# Patient Record
Sex: FEMALE | Race: WHITE | NOT HISPANIC OR LATINO | Employment: STUDENT | URBAN - METROPOLITAN AREA
[De-identification: names, ages, dates, MRNs, and addresses within clinical notes are randomized per-mention and may not be internally consistent; named-entity substitution may affect disease eponyms.]

---

## 2023-09-01 PROBLEM — S63.619A SPRAINED FINGER AND THUMB: Status: ACTIVE | Noted: 2023-09-01

## 2023-09-01 PROBLEM — S63.609A SPRAINED FINGER AND THUMB: Status: ACTIVE | Noted: 2023-09-01

## 2023-09-01 PROBLEM — S69.90XA INJURY OF FINGER: Status: ACTIVE | Noted: 2023-09-01

## 2023-09-01 PROBLEM — M25.579 ANKLE PAIN: Status: ACTIVE | Noted: 2023-09-01

## 2023-09-01 PROBLEM — H69.91 DYSFUNCTION OF RIGHT EUSTACHIAN TUBE: Status: ACTIVE | Noted: 2023-09-01

## 2023-09-01 PROBLEM — E55.9 VITAMIN D DEFICIENCY: Status: ACTIVE | Noted: 2023-09-01

## 2023-09-01 PROBLEM — M47.819 SPONDYLOARTHRITIS: Status: ACTIVE | Noted: 2023-09-01

## 2023-09-01 PROBLEM — J45.990 BRONCHOSPASM, EXERCISE-INDUCED (HHS-HCC): Status: ACTIVE | Noted: 2023-09-01

## 2023-09-01 PROBLEM — M06.09 POLYARTHRITIS WITH NEGATIVE RHEUMATOID FACTOR (MULTI): Status: ACTIVE | Noted: 2023-09-01

## 2023-09-01 PROBLEM — F41.9 ANXIETY AND DEPRESSION: Status: ACTIVE | Noted: 2023-09-01

## 2023-09-01 PROBLEM — M32.19 OTHER ORGAN OR SYSTEM INVOLVEMENT IN SYSTEMIC LUPUS ERYTHEMATOSUS (MULTI): Status: ACTIVE | Noted: 2023-09-01

## 2023-09-01 PROBLEM — G90.50 COMPLEX REGIONAL PAIN SYNDROME I: Status: ACTIVE | Noted: 2023-09-01

## 2023-09-01 PROBLEM — F32.A ANXIETY AND DEPRESSION: Status: ACTIVE | Noted: 2023-09-01

## 2023-09-01 PROBLEM — S89.90XA INJURY OF KNEE: Status: ACTIVE | Noted: 2023-09-01

## 2023-09-01 RX ORDER — TRAZODONE HYDROCHLORIDE 150 MG/1
TABLET ORAL
COMMUNITY

## 2023-09-01 RX ORDER — BECLOMETHASONE DIPROPIONATE HFA 80 UG/1
80 AEROSOL, METERED RESPIRATORY (INHALATION)
COMMUNITY
Start: 2019-12-16

## 2023-09-01 RX ORDER — BUPROPION HYDROCHLORIDE 300 MG/1
TABLET ORAL
COMMUNITY

## 2023-09-01 RX ORDER — GLUC/MSM/COLGN2/HYAL/ANTIARTH3 375-375-20
TABLET ORAL
COMMUNITY

## 2023-09-01 RX ORDER — NORETHINDRONE AND ETHINYL ESTRADIOL AND FERROUS FUMARATE 0.4-35(21)
1 KIT ORAL DAILY
COMMUNITY
End: 2024-02-23 | Stop reason: SDUPTHER

## 2023-09-01 RX ORDER — ERGOCALCIFEROL 1.25 MG/1
50000 CAPSULE ORAL
COMMUNITY
Start: 2023-03-03

## 2023-09-01 RX ORDER — GABAPENTIN 300 MG/1
CAPSULE ORAL
COMMUNITY
Start: 2022-06-07

## 2023-09-01 RX ORDER — GABAPENTIN 400 MG/1
400 CAPSULE ORAL 3 TIMES DAILY
COMMUNITY
Start: 2022-06-07 | End: 2024-02-23 | Stop reason: SDUPTHER

## 2023-09-01 RX ORDER — ASCORBIC ACID 500 MG
TABLET ORAL
COMMUNITY

## 2023-09-01 RX ORDER — LIDOCAINE HYDROCHLORIDE 20 MG/ML
SOLUTION ORAL; TOPICAL
COMMUNITY
Start: 2022-11-05 | End: 2023-12-15 | Stop reason: ALTCHOICE

## 2023-09-01 RX ORDER — ESCITALOPRAM OXALATE 20 MG/1
TABLET ORAL
COMMUNITY
Start: 2019-08-28

## 2023-09-01 RX ORDER — HYDROXYCHLOROQUINE SULFATE 200 MG/1
TABLET, FILM COATED ORAL
COMMUNITY
Start: 2022-06-07 | End: 2023-10-07

## 2023-09-01 RX ORDER — BELIMUMAB 200 MG/ML
200 SOLUTION SUBCUTANEOUS
COMMUNITY
Start: 2023-03-06 | End: 2023-10-25 | Stop reason: SDUPTHER

## 2023-09-01 RX ORDER — ALBUTEROL SULFATE 90 UG/1
2 AEROSOL, METERED RESPIRATORY (INHALATION) AS NEEDED
COMMUNITY
Start: 2020-07-31

## 2023-09-01 RX ORDER — UBIDECARENONE 75 MG
CAPSULE ORAL
COMMUNITY

## 2023-10-07 DIAGNOSIS — R76.8 OTHER SPECIFIED ABNORMAL IMMUNOLOGICAL FINDINGS IN SERUM: ICD-10-CM

## 2023-10-07 RX ORDER — HYDROXYCHLOROQUINE SULFATE 200 MG/1
TABLET, FILM COATED ORAL
Qty: 30 TABLET | Refills: 11 | Status: SHIPPED | OUTPATIENT
Start: 2023-10-07

## 2023-10-25 DIAGNOSIS — M47.819 SPONDYLOARTHRITIS: ICD-10-CM

## 2023-10-25 DIAGNOSIS — M06.09 POLYARTHRITIS WITH NEGATIVE RHEUMATOID FACTOR (MULTI): ICD-10-CM

## 2023-10-25 RX ORDER — BELIMUMAB 200 MG/ML
200 SOLUTION SUBCUTANEOUS
Qty: 12 ML | Refills: 11 | Status: SHIPPED | OUTPATIENT
Start: 2023-10-25 | End: 2024-03-19

## 2023-10-25 NOTE — TELEPHONE ENCOUNTER
PT CALLED & STATES NEEDS NEW Rx FOR BENLYSTA BECAUSE INS WON'T COVER MEDS NOW. INFORMED PT WOULD HAVE DR ACUÑA SEND NEW Rx & THIS WOULD THEN GENERATE A NEW PA. THAT IS PROBABLY WHAT SHE NEEDS.

## 2023-10-30 ENCOUNTER — SPECIALTY PHARMACY (OUTPATIENT)
Dept: PHARMACY | Facility: CLINIC | Age: 18
End: 2023-10-30

## 2023-11-29 ENCOUNTER — APPOINTMENT (OUTPATIENT)
Dept: RHEUMATOLOGY | Facility: CLINIC | Age: 18
End: 2023-11-29

## 2023-12-15 ENCOUNTER — OFFICE VISIT (OUTPATIENT)
Dept: PRIMARY CARE | Facility: CLINIC | Age: 18
End: 2023-12-15
Payer: COMMERCIAL

## 2023-12-15 VITALS
HEART RATE: 105 BPM | HEIGHT: 62 IN | TEMPERATURE: 98.1 F | BODY MASS INDEX: 20.98 KG/M2 | WEIGHT: 114 LBS | DIASTOLIC BLOOD PRESSURE: 72 MMHG | SYSTOLIC BLOOD PRESSURE: 108 MMHG | OXYGEN SATURATION: 97 %

## 2023-12-15 DIAGNOSIS — R35.0 URINARY FREQUENCY: ICD-10-CM

## 2023-12-15 DIAGNOSIS — N30.90 CYSTITIS: Primary | ICD-10-CM

## 2023-12-15 LAB
POC APPEARANCE, URINE: CLEAR
POC BILIRUBIN, URINE: NEGATIVE
POC BLOOD, URINE: NEGATIVE
POC COLOR, URINE: YELLOW
POC GLUCOSE, URINE: NEGATIVE MG/DL
POC KETONES, URINE: NEGATIVE MG/DL
POC LEUKOCYTES, URINE: ABNORMAL
POC NITRITE,URINE: POSITIVE
POC PH, URINE: 7 PH
POC PROTEIN, URINE: NEGATIVE MG/DL
POC SPECIFIC GRAVITY, URINE: 1.01
POC UROBILINOGEN, URINE: 0.2 EU/DL

## 2023-12-15 PROCEDURE — 81003 URINALYSIS AUTO W/O SCOPE: CPT

## 2023-12-15 PROCEDURE — 99213 OFFICE O/P EST LOW 20 MIN: CPT

## 2023-12-15 PROCEDURE — 87086 URINE CULTURE/COLONY COUNT: CPT

## 2023-12-15 PROCEDURE — 1036F TOBACCO NON-USER: CPT

## 2023-12-15 RX ORDER — NITROFURANTOIN 25; 75 MG/1; MG/1
100 CAPSULE ORAL 2 TIMES DAILY
Qty: 14 CAPSULE | Refills: 0 | Status: SHIPPED | OUTPATIENT
Start: 2023-12-15 | End: 2023-12-22

## 2023-12-15 ASSESSMENT — PATIENT HEALTH QUESTIONNAIRE - PHQ9
2. FEELING DOWN, DEPRESSED OR HOPELESS: NOT AT ALL
1. LITTLE INTEREST OR PLEASURE IN DOING THINGS: NOT AT ALL
SUM OF ALL RESPONSES TO PHQ9 QUESTIONS 1 AND 2: 0

## 2023-12-15 ASSESSMENT — ENCOUNTER SYMPTOMS
HEMATURIA: 0
SWEATS: 0
NAUSEA: 0
CHILLS: 0
FREQUENCY: 1
FLANK PAIN: 0
VOMITING: 0

## 2023-12-15 ASSESSMENT — PAIN SCALES - GENERAL: PAINLEVEL: 4

## 2023-12-15 NOTE — PROGRESS NOTES
"Subjective   Patient ID: Rayna Willams is a 18 y.o. female who presents for Urinary Frequency (X 3 days) and Urinary Problem.    UTI   This is a new problem. Episode onset: 3 days ago. The problem occurs every urination. The problem has been unchanged. The quality of the pain is described as burning. The pain is at a severity of 4/10. The pain is moderate. There has been no fever. She is Sexually active. There is No history of pyelonephritis. Associated symptoms include frequency, hesitancy and urgency. Pertinent negatives include no chills, discharge, flank pain, hematuria, nausea, possible pregnancy, sweats or vomiting. She has tried nothing for the symptoms. The treatment provided no relief. Her past medical history is significant for recurrent UTIs. There is no history of catheterization, kidney stones, a single kidney, urinary stasis or a urological procedure.            Review of Systems   Constitutional:  Negative for chills, fatigue and fever.   Gastrointestinal:  Negative for abdominal pain, nausea and vomiting.   Genitourinary:  Positive for dysuria, frequency, hesitancy and urgency. Negative for difficulty urinating, flank pain and hematuria.   Musculoskeletal:  Negative for back pain.       Objective   Blood Pressure 108/72 (BP Location: Left arm)   Pulse 105   Temperature 36.7 °C (98.1 °F) (Temporal)   Height 1.575 m (5' 2\")   Weight 51.7 kg (114 lb)   Oxygen Saturation 97%   Body Mass Index 20.85 kg/m²     Physical Exam  Vitals and nursing note reviewed.   Constitutional:       General: She is not in acute distress.     Appearance: Normal appearance. She is normal weight.   Cardiovascular:      Rate and Rhythm: Normal rate and regular rhythm.      Heart sounds: Normal heart sounds, S1 normal and S2 normal.   Pulmonary:      Effort: Pulmonary effort is normal.      Breath sounds: Normal breath sounds.   Abdominal:      General: Abdomen is flat. Bowel sounds are normal. There is no distension.      " Palpations: Abdomen is soft. There is no hepatomegaly or splenomegaly.      Tenderness: There is abdominal tenderness in the suprapubic area. There is no right CVA tenderness, left CVA tenderness, guarding or rebound.   Skin:     General: Skin is warm and dry.      Capillary Refill: Capillary refill takes less than 2 seconds.   Neurological:      General: No focal deficit present.      Mental Status: She is alert.         Assessment/Plan   Problem List Items Addressed This Visit    None  Visit Diagnoses       Diagnosis Codes    Cystitis    -  Primary  Acute.  Urine dipstick: +leukocytes, +nitrates  Urine sent for C & S, will call with results and adjust treatment accordingly.  Start ATB - please complete full course unless you hear otherwise from our office.  Discussed indication for antibiotic use, administration instructions, and adverse effects with patient.  Increase your fluid intake, Tylenol or Motrin as needed for pain or fever.  May use OTC AZO/UROSTAT/Cystex for symptoms relief if desired.   Discussed hygiene for prevention.  Call our office to report and new fever/chills or back pain.  Follow up in 1 week if symptoms persist.    N30.90    Relevant Medications    nitrofurantoin, macrocrystal-monohydrate, (Macrobid) 100 mg capsule    Other Relevant Orders    Urine Culture (Completed)    Urinary frequency     R35.0    Relevant Orders    POCT UA Automated manually resulted (Completed)

## 2023-12-16 LAB — BACTERIA UR CULT: NORMAL

## 2023-12-17 ASSESSMENT — ENCOUNTER SYMPTOMS
DIFFICULTY URINATING: 0
DYSURIA: 1
BACK PAIN: 0
FATIGUE: 0
ABDOMINAL PAIN: 0
FEVER: 0

## 2024-01-22 ENCOUNTER — LAB (OUTPATIENT)
Dept: LAB | Facility: LAB | Age: 19
End: 2024-01-22
Payer: COMMERCIAL

## 2024-01-22 DIAGNOSIS — M06.09 RHEUMATOID ARTHRITIS WITHOUT RHEUMATOID FACTOR, MULTIPLE SITES (MULTI): ICD-10-CM

## 2024-01-22 DIAGNOSIS — D64.9 ANEMIA, UNSPECIFIED: ICD-10-CM

## 2024-01-22 DIAGNOSIS — E55.9 VITAMIN D DEFICIENCY, UNSPECIFIED: ICD-10-CM

## 2024-01-22 DIAGNOSIS — R76.8 OTHER SPECIFIED ABNORMAL IMMUNOLOGICAL FINDINGS IN SERUM: Primary | ICD-10-CM

## 2024-01-22 DIAGNOSIS — R59.1 GENERALIZED ENLARGED LYMPH NODES: ICD-10-CM

## 2024-01-22 DIAGNOSIS — M32.19 OTHER ORGAN OR SYSTEM INVOLVEMENT IN SYSTEMIC LUPUS ERYTHEMATOSUS (MULTI): ICD-10-CM

## 2024-01-22 DIAGNOSIS — M47.819 SPONDYLOSIS WITHOUT MYELOPATHY OR RADICULOPATHY, SITE UNSPECIFIED: ICD-10-CM

## 2024-01-22 DIAGNOSIS — Z79.899 OTHER LONG TERM (CURRENT) DRUG THERAPY: ICD-10-CM

## 2024-01-22 DIAGNOSIS — M79.2 NEURALGIA AND NEURITIS, UNSPECIFIED: ICD-10-CM

## 2024-01-22 LAB
25(OH)D3 SERPL-MCNC: 20 NG/ML (ref 31–100)
ALBUMIN SERPL-MCNC: 4.5 G/DL (ref 3.5–5)
ALP BLD-CCNC: 47 U/L (ref 35–125)
ALT SERPL-CCNC: 8 U/L (ref 5–40)
ANION GAP SERPL CALC-SCNC: 12 MMOL/L
APPEARANCE UR: ABNORMAL
AST SERPL-CCNC: 13 U/L (ref 5–40)
BACTERIA #/AREA URNS AUTO: ABNORMAL /HPF
BASOPHILS # BLD AUTO: 0.04 X10*3/UL (ref 0–0.1)
BASOPHILS NFR BLD AUTO: 0.6 %
BILIRUB SERPL-MCNC: <0.2 MG/DL (ref 0.1–1.2)
BILIRUB UR STRIP.AUTO-MCNC: NEGATIVE MG/DL
BUN SERPL-MCNC: 11 MG/DL (ref 8–25)
C3 SERPL-MCNC: 114 MG/DL (ref 87–200)
C4 SERPL-MCNC: 16 MG/DL (ref 10–50)
CALCIUM SERPL-MCNC: 9.4 MG/DL (ref 8.5–10.4)
CHLORIDE SERPL-SCNC: 101 MMOL/L (ref 97–107)
CK SERPL-CCNC: 27 U/L (ref 24–195)
CO2 SERPL-SCNC: 25 MMOL/L (ref 24–31)
COLOR UR: ABNORMAL
CREAT SERPL-MCNC: 0.7 MG/DL (ref 0.4–1.6)
CRP SERPL-MCNC: <0.3 MG/DL (ref 0–2)
DSDNA AB SER-ACNC: 1 IU/ML
EGFRCR SERPLBLD CKD-EPI 2021: >90 ML/MIN/1.73M*2
EOSINOPHIL # BLD AUTO: 0.18 X10*3/UL (ref 0–0.7)
EOSINOPHIL NFR BLD AUTO: 2.7 %
ERYTHROCYTE [DISTWIDTH] IN BLOOD BY AUTOMATED COUNT: 11.9 % (ref 11.5–14.5)
ERYTHROCYTE [SEDIMENTATION RATE] IN BLOOD BY WESTERGREN METHOD: 15 MM/H (ref 0–20)
GLUCOSE SERPL-MCNC: 95 MG/DL (ref 65–99)
GLUCOSE UR STRIP.AUTO-MCNC: NORMAL MG/DL
HCT VFR BLD AUTO: 38.7 % (ref 36–46)
HGB BLD-MCNC: 12.4 G/DL (ref 12–16)
IGA SERPL-MCNC: 183 MG/DL (ref 70–400)
IGE SERPL-ACNC: 70 IU/ML (ref 0–214)
IGG SERPL-MCNC: 1150 MG/DL (ref 700–1600)
IGM SERPL-MCNC: 69 MG/DL (ref 40–230)
IMM GRANULOCYTES # BLD AUTO: 0.01 X10*3/UL (ref 0–0.7)
IMM GRANULOCYTES NFR BLD AUTO: 0.2 % (ref 0–0.9)
KETONES UR STRIP.AUTO-MCNC: NEGATIVE MG/DL
LEUKOCYTE ESTERASE UR QL STRIP.AUTO: ABNORMAL
LYMPHOCYTES # BLD AUTO: 2.39 X10*3/UL (ref 1.2–4.8)
LYMPHOCYTES NFR BLD AUTO: 35.9 %
MCH RBC QN AUTO: 28.6 PG (ref 26–34)
MCHC RBC AUTO-ENTMCNC: 32 G/DL (ref 32–36)
MCV RBC AUTO: 89 FL (ref 80–100)
MONOCYTES # BLD AUTO: 0.57 X10*3/UL (ref 0.1–1)
MONOCYTES NFR BLD AUTO: 8.6 %
MUCOUS THREADS #/AREA URNS AUTO: ABNORMAL /LPF
NEUTROPHILS # BLD AUTO: 3.46 X10*3/UL (ref 1.2–7.7)
NEUTROPHILS NFR BLD AUTO: 52 %
NITRITE UR QL STRIP.AUTO: NEGATIVE
NRBC BLD-RTO: 0 /100 WBCS (ref 0–0)
PH UR STRIP.AUTO: 6 [PH]
PLATELET # BLD AUTO: 343 X10*3/UL (ref 150–450)
POTASSIUM SERPL-SCNC: 3.8 MMOL/L (ref 3.4–5.1)
PROT SERPL-MCNC: 7 G/DL (ref 5.9–7.9)
PROT UR STRIP.AUTO-MCNC: ABNORMAL MG/DL
RBC # BLD AUTO: 4.34 X10*6/UL (ref 4–5.2)
RBC # UR STRIP.AUTO: NEGATIVE /UL
RBC #/AREA URNS AUTO: ABNORMAL /HPF
SODIUM SERPL-SCNC: 138 MMOL/L (ref 133–145)
SP GR UR STRIP.AUTO: 1.01
SQUAMOUS #/AREA URNS AUTO: ABNORMAL /HPF
UROBILINOGEN UR STRIP.AUTO-MCNC: NORMAL MG/DL
WBC # BLD AUTO: 6.7 X10*3/UL (ref 4.4–11.3)
WBC #/AREA URNS AUTO: ABNORMAL /HPF
WBC CLUMPS #/AREA URNS AUTO: ABNORMAL /HPF
YEAST BUDDING #/AREA UR COMP ASSIST: PRESENT /HPF

## 2024-01-22 PROCEDURE — 82306 VITAMIN D 25 HYDROXY: CPT

## 2024-01-22 PROCEDURE — 81001 URINALYSIS AUTO W/SCOPE: CPT

## 2024-01-22 PROCEDURE — 85652 RBC SED RATE AUTOMATED: CPT

## 2024-01-22 PROCEDURE — 86332 IMMUNE COMPLEX ASSAY: CPT

## 2024-01-22 PROCEDURE — 82785 ASSAY OF IGE: CPT

## 2024-01-22 PROCEDURE — 85025 COMPLETE CBC W/AUTO DIFF WBC: CPT

## 2024-01-22 PROCEDURE — 82784 ASSAY IGA/IGD/IGG/IGM EACH: CPT

## 2024-01-22 PROCEDURE — 86225 DNA ANTIBODY NATIVE: CPT

## 2024-01-22 PROCEDURE — 86160 COMPLEMENT ANTIGEN: CPT

## 2024-01-22 PROCEDURE — 80053 COMPREHEN METABOLIC PANEL: CPT

## 2024-01-22 PROCEDURE — 36415 COLL VENOUS BLD VENIPUNCTURE: CPT

## 2024-01-22 PROCEDURE — 86140 C-REACTIVE PROTEIN: CPT

## 2024-01-22 PROCEDURE — 82550 ASSAY OF CK (CPK): CPT

## 2024-01-29 LAB
SCAN RESULT: NORMAL
SCAN RESULT: NORMAL

## 2024-02-06 LAB
C1Q SERPL-MCNC: 12.7 MG/DL (ref 10.3–20.5)
IC SERPL C1Q BIND-ACNC: <1.2 UG EQ/ML
IC SERPL RAJI CELL-ACNC: 11.1 UG EQ/ML

## 2024-02-19 PROBLEM — M76.821 POSTERIOR TIBIAL TENDINITIS OF RIGHT LOWER EXTREMITY: Status: ACTIVE | Noted: 2024-02-19

## 2024-02-19 PROBLEM — R35.0 URINARY FREQUENCY: Status: ACTIVE | Noted: 2023-08-04

## 2024-02-19 PROBLEM — D64.9 ANEMIA: Status: ACTIVE | Noted: 2023-07-17

## 2024-02-19 PROBLEM — F45.42 PAIN DISORDER ASSOCIATED WITH PSYCHOLOGICAL FACTORS AND MEDICAL CONDITION: Status: ACTIVE | Noted: 2019-07-24

## 2024-02-19 PROBLEM — L27.0 DERMATITIS DUE TO DRUG: Status: ACTIVE | Noted: 2024-02-19

## 2024-02-19 PROBLEM — M54.2 NECK PAIN: Status: ACTIVE | Noted: 2024-02-19

## 2024-02-19 PROBLEM — M79.2 NEURALGIA AND NEURITIS, UNSPECIFIED: Status: ACTIVE | Noted: 2023-07-17

## 2024-02-19 PROBLEM — R53.83 FATIGUE: Status: ACTIVE | Noted: 2024-02-19

## 2024-02-19 PROBLEM — Z79.3 LONG TERM (CURRENT) USE OF HORMONAL CONTRACEPTIVES: Status: ACTIVE | Noted: 2024-02-19

## 2024-02-19 RX ORDER — NORETHINDRONE ACETATE AND ETHINYL ESTRADIOL .03; 1.5 MG/1; MG/1
TABLET ORAL EVERY 24 HOURS
COMMUNITY
Start: 2023-07-26

## 2024-02-19 RX ORDER — GABAPENTIN 100 MG/1
CAPSULE ORAL
COMMUNITY
Start: 2024-01-16

## 2024-02-23 ENCOUNTER — OFFICE VISIT (OUTPATIENT)
Dept: RHEUMATOLOGY | Facility: CLINIC | Age: 19
End: 2024-02-23
Payer: COMMERCIAL

## 2024-02-23 VITALS
OXYGEN SATURATION: 98 % | WEIGHT: 110.89 LBS | HEART RATE: 89 BPM | HEIGHT: 61 IN | BODY MASS INDEX: 20.94 KG/M2 | SYSTOLIC BLOOD PRESSURE: 104 MMHG | DIASTOLIC BLOOD PRESSURE: 64 MMHG

## 2024-02-23 DIAGNOSIS — M32.19 OTHER ORGAN OR SYSTEM INVOLVEMENT IN SYSTEMIC LUPUS ERYTHEMATOSUS (MULTI): Primary | ICD-10-CM

## 2024-02-23 DIAGNOSIS — M06.09 POLYARTHRITIS WITH NEGATIVE RHEUMATOID FACTOR (MULTI): ICD-10-CM

## 2024-02-23 DIAGNOSIS — Z79.899 ENCOUNTER FOR LONG-TERM (CURRENT) USE OF MEDICATIONS: ICD-10-CM

## 2024-02-23 DIAGNOSIS — E55.9 VITAMIN D DEFICIENCY: ICD-10-CM

## 2024-02-23 PROCEDURE — 99214 OFFICE O/P EST MOD 30 MIN: CPT | Performed by: INTERNAL MEDICINE

## 2024-02-23 PROCEDURE — 1036F TOBACCO NON-USER: CPT | Performed by: INTERNAL MEDICINE

## 2024-02-23 ASSESSMENT — ROUTINE ASSESSMENT OF PATIENT INDEX DATA (RAPID3)
PICK_CLOTHES_OFF_FLOOR: WITHOUT ANY DIFFICULTY
LIFT_CUP_TO_MOUTH: WITHOUT ANY DIFFICULTY
ON A SCALE OF ONE TO TEN, CONSIDERING ALL THE WAYS IN WHICH ILLNESS AND HEALTH CONDITIONS MAY AFFECT YOU AT THIS TIME, PLEASE INDICATE BELOW HOW YOU ARE DOING:: 5
TURN_FAUCETS_OFF: WITHOUT ANY DIFFICULTY
ON A SCALE OF ONE TO TEN, HOW MUCH PAIN HAVE YOU HAD BECAUSE OF YOUR CONDITION OVER THE PAST WEEK?: 5
FEELINGS_DEPRESSION: WITHOUT ANY DIFFICULTY
DRESS_YOURSELF: WITH SOME DIFFICULTY
WALK_KILOMETERS: WITH SOME DIFFICULTY
WEIGHTED_TOTAL_SCORE: 4
SEVERITY_SCORE: 0
ON A SCALE OF ONE TO TEN, CONSIDERING ALL THE WAYS IN WHICH ILLNESS AND HEALTH CONDITIONS MAY AFFECT YOU AT THIS TIME, PLEASE INDICATE BELOW HOW YOU ARE DOING:: 5
FN_SCORE: 2
ON A SCALE OF ONE TO TEN, HOW MUCH PAIN HAVE YOU HAD BECAUSE OF YOUR CONDITION OVER THE PAST WEEK?: 5
GOOD_NIGHTS_SLEEP: WITH SOME DIFFICULTY
IN_OUT_BED: WITHOUT ANY DIFFICULTY
TOTAL RAPID3 SCORE: 12
WALK_FLAT_GROUND: WITH SOME DIFFICULTY
IN_OUT_TRANSPORT: WITH SOME DIFFICULTY
SUM OF QUESTIONS A TO J: 6
WASH_DRY_BODY: WITH SOME DIFFICULTY
FEELINGS_ANXIETY_NERVOUS: WITH SOME DIFFICULTY
PARTIPATE_RECREATIONAL_ACTIVITIES: WITH SOME DIFFICULTY

## 2024-02-23 ASSESSMENT — PATIENT HEALTH QUESTIONNAIRE - PHQ9
1. LITTLE INTEREST OR PLEASURE IN DOING THINGS: NOT AT ALL
SUM OF ALL RESPONSES TO PHQ9 QUESTIONS 1 AND 2: 0
2. FEELING DOWN, DEPRESSED OR HOPELESS: NOT AT ALL

## 2024-02-23 ASSESSMENT — ENCOUNTER SYMPTOMS
OCCASIONAL FEELINGS OF UNSTEADINESS: 1
DEPRESSION: 0
LOSS OF SENSATION IN FEET: 0

## 2024-02-23 ASSESSMENT — PAIN SCALES - GENERAL: PAINLEVEL_OUTOF10: 3

## 2024-02-23 ASSESSMENT — PAIN - FUNCTIONAL ASSESSMENT: PAIN_FUNCTIONAL_ASSESSMENT: 0-10

## 2024-02-23 NOTE — PROGRESS NOTES
[facility]/  Rheumatology  [address] [place] [address], [address]  [place] [id number]  Phone: [phone]  Fax: [phone]    Rheumatology Progress Note 2/23/24    Rayna Willams is a 18 y.o. female here for   Chief Complaint   Patient presents with    Follow-up     labs       Last Visit: 7/7/23    Rheum Hx      Previous Tx    Health Maintenance  DXA- none  Malignancy Hx- none  Immunization History   Administered Date(s) Administered    DTP 01/13/2004, 03/05/2004, 05/26/2004, 02/16/2005    DTaP vaccine, pediatric (DAPTACEL) 03/03/2009    DTaP, Unspecified 01/13/2004, 03/25/2004, 05/27/2004, 02/17/2005    Flu vaccine (IIV4), preservative free *Check age/dose* 10/18/2016    HPV 9-valent vaccine (GARDASIL 9) 12/18/2015, 02/19/2016, 06/20/2016    HPV, Unspecified 12/18/2015, 02/19/2016, 06/20/2016    Hepatitis A vaccine, pediatric/adolescent (HAVRIX, VAQTA) 03/03/2009, 07/16/2010    Hepatitis B vaccine, adult (RECOMBIVAX, ENGERIX) 2003    Hepatitis B vaccine, pediatric/adolescent (RECOMBIVAX, ENGERIX) 2003, 2003, 08/17/2004    HiB, unspecified 01/13/2004, 03/25/2004, 05/27/2004, 02/17/2005    Influenza, Unspecified 11/15/2019, 11/03/2020    Influenza, live, intranasal 09/08/2011, 09/11/2012, 10/31/2013, 09/30/2014, 10/19/2018    Influenza, live, intranasal, quadrivalent 10/16/2009, 12/18/2015    Influenza, seasonal, injectable 11/15/2019, 11/03/2020    MMR vaccine, subcutaneous (MMR II) 02/17/2005, 02/28/2008    Meningococcal ACWY vaccine (MENVEO) 11/15/2019    Meningococcal B vaccine, recombinant (TRUMENBA) 11/15/2019, 12/19/2019    Meningococcal B, Unspecified 11/15/2019, 12/19/2019    Meningococcal MCV4, Unspecified 12/02/2014    Meningococcal MCV4P 12/02/2014    Novel Influenza-H1N1-09, nasal 11/05/2009, 12/03/2009    Novel influenza-H1N1-09, preservative-free 10/05/2009    Pneumococcal Conjugate PCV 7 01/13/2004, 03/25/2004, 08/17/2004    Pneumococcal polysaccharide vaccine,  23-valent, age 2 years and older (PNEUMOVAX 23) 01/13/2004, 03/25/2004, 08/17/2004    Polio, Unspecified 01/13/2004, 03/25/2004, 05/19/2005, 02/28/2008    Poliovirus vaccine, subcutaneous (IPOL) 01/13/2004, 03/25/2004, 05/19/2005, 02/28/2008    Td (adult), unspecified 11/03/2020    Tdap vaccine, age 7 year and older (BOOSTRIX, ADACEL) 12/02/2014    Varicella vaccine, subcutaneous (VARIVAX) 11/18/2004, 03/03/2009          Past Medical History:   Diagnosis Date    Achilles tendinitis, right leg 10/19/2018    Achilles tendinitis of right lower extremity    Acute laryngotracheitis 11/02/2016    Acute viral laryngotracheitis    Acute pharyngitis, unspecified 05/13/2016    Sore throat    Acute pharyngitis, unspecified 11/02/2016    Sore throat    Acute pharyngitis, unspecified 08/08/2013    Sore throat    Acute upper respiratory infection, unspecified 09/04/2017    Viral URI    Anxiety disorder, unspecified 05/13/2016    Anxiety    Chondrocostal junction syndrome (tietze) 09/12/2019    Costochondritis    Contusion of right index finger without damage to nail, initial encounter 01/26/2016    Contusion of right index finger    Displaced fracture of proximal phalanx of left little finger, initial encounter for closed fracture 12/17/2015    Fracture of fifth finger, proximal phalanx, left, closed    Encounter for immunization 10/18/2016    Encounter for immunization    Enlarged lymph nodes, unspecified 07/30/2020    Enlarged lymph nodes    Generalized skin eruption due to drugs and medicaments taken internally 08/14/2013    Dermatitis due to drugs or medicines    Other chest pain 07/25/2018    Musculoskeletal chest pain    Other conditions influencing health status 08/14/2013    Pneumonia    Other conditions influencing health status 01/20/2017    History of frequent headaches    Pain in left foot     Left foot pain    Pain in right ankle and joints of right foot 08/27/2018    Ankle pain, right    Pain in unspecified foot  04/25/2014    Foot pain    Personal history of other (healed) physical injury and trauma 04/25/2014    History of sprain of ankle    Personal history of other (healed) physical injury and trauma 04/25/2014    History of sprain of foot    Personal history of other diseases of the circulatory system 12/19/2019    History of lymphadenitis    Personal history of other diseases of the musculoskeletal system and connective tissue 07/30/2020    History of neck pain    Personal history of other diseases of the respiratory system 01/22/2017    History of sore throat    Personal history of other infectious and parasitic diseases 01/20/2017    History of viral infection    Personal history of other infectious and parasitic diseases 08/08/2013    History of viral illness    Personal history of other specified conditions 10/26/2020    History of dysuria    Personal history of other specified conditions 10/23/2020    History of urinary urgency    Personal history of other specified conditions 09/28/2017    History of exertional chest pain    Personal history of other specified conditions 10/18/2016    History of headache    Personal history of other specified conditions 08/14/2013    History of fever    Personal history of other specified conditions 12/19/2019    History of fatigue    Postconcussional syndrome 10/18/2016    Post concussion syndrome    Posterior tibial tendinitis, right leg 03/25/2018    Posterior tibial tendinitis of right lower extremity    Strain of muscle, fascia and tendon of triceps, left arm, initial encounter 02/27/2017    Triceps strain, left, initial encounter    Strain of unspecified muscle and tendon at ankle and foot level, right foot, initial encounter 01/18/2018    Right ankle strain    Unspecified asthma, uncomplicated 11/15/2019    Mild asthma    Unspecified disorder of eyelid 01/26/2016    Lesion of upper eyelid    Unspecified injury of left elbow, initial encounter 02/27/2017    Elbow injury,  "left, initial encounter    Unspecified injury of right wrist, hand and finger(s), initial encounter 01/26/2016    Injury of hand, right      Past Surgical History:   Procedure Laterality Date    OTHER SURGICAL HISTORY  10/08/2020    Lymphadenectomy    SPINAL CORD STIMULATOR IMPLANT  12/2022    WISDOM TOOTH EXTRACTION        Current Outpatient Medications   Medication Sig Dispense Refill    albuterol 90 mcg/actuation inhaler Inhale 2 puffs if needed. EVERY 4 TO 6 HOURS AS NEEDED      ascorbic acid (Vitamin C) 500 mg tablet Take by mouth.      beclomethasone dipropionate (Qvar RediHaler) 80 mcg/actuation inhaler Inhale 1 Inhalation 2 times a day.      belimumab (Benlysta) 200 mg/mL injection Inject 1 mL (200 mg) under the skin 1 (one) time per week. 12 mL 11    buPROPion XL (Wellbutrin XL) 300 mg 24 hr tablet Take by mouth.      CALCIUM ORAL Take by mouth.      cyanocobalamin (Vitamin B-12) 500 mcg tablet Take by mouth.      ergocalciferol (Vitamin D-2) 1.25 MG (28284 UT) capsule Take 1 capsule (50,000 Units) by mouth 1 (one) time per week.      escitalopram (Lexapro) 20 mg tablet Take by mouth.      ferrous gluconate 236 mg (27 mg iron) tablet Take by mouth.      gabapentin (Neurontin) 100 mg capsule       gabapentin (Neurontin) 300 mg capsule Take by mouth.      hydroxychloroquine (Plaquenil) 200 mg tablet TAKE 1 TABLET BY MOUTH DAILY 30 DAYS 30 tablet 11    norethindrone ac-eth estradioL (Loestrin) 1.5-30 mg-mcg tablet tablet once every 24 hours.      traZODone (Desyrel) 150 mg tablet Take by mouth.       No current facility-administered medications for this visit.      Allergies   Allergen Reactions    Pollen Extracts Itching     Sneezing, itching, watery eyes        Vitals:    02/23/24 1300   BP: 104/64   Pulse: 89   SpO2: 98%   Weight: 50.3 kg (110 lb 14.3 oz)   Height: 1.549 m (5' 1\")     Pain Assessment Pain Score: 3, Pain Location: Knee (left knee)     Rapid 3  Function Score (FN): 2  Pain Score (PN) (0-10): " 5  Patient Global (PTGL) (0-10): 5  Rapid3 Score: 12  RAPID3 Weighted Score: 4       Workup  Component      Latest Ref Rng 1/22/2024   WBC      4.4 - 11.3 x10*3/uL 6.7    nRBC      0.0 - 0.0 /100 WBCs 0.0    RBC      4.00 - 5.20 x10*6/uL 4.34    HEMOGLOBIN      12.0 - 16.0 g/dL 12.4    HEMATOCRIT      36.0 - 46.0 % 38.7    MCV      80 - 100 fL 89    MCH      26.0 - 34.0 pg 28.6    MCHC      32.0 - 36.0 g/dL 32.0    RED CELL DISTRIBUTION WIDTH      11.5 - 14.5 % 11.9    Platelets      150 - 450 x10*3/uL 343    Neutrophils %      40.0 - 80.0 % 52.0    Immature Granulocytes %, Automated      0.0 - 0.9 % 0.2    Lymphocytes %      13.0 - 44.0 % 35.9    Monocytes %      2.0 - 10.0 % 8.6    Eosinophils %      0.0 - 6.0 % 2.7    Basophils %      0.0 - 2.0 % 0.6    Neutrophils Absolute      1.20 - 7.70 x10*3/uL 3.46    Immature Granulocytes Absolute, Automated      0.00 - 0.70 x10*3/uL 0.01    Lymphocytes Absolute      1.20 - 4.80 x10*3/uL 2.39    Monocytes Absolute      0.10 - 1.00 x10*3/uL 0.57    Eosinophils Absolute      0.00 - 0.70 x10*3/uL 0.18    Basophils Absolute      0.00 - 0.10 x10*3/uL 0.04    GLUCOSE      65 - 99 mg/dL 95    SODIUM      133 - 145 mmol/L 138    POTASSIUM      3.4 - 5.1 mmol/L 3.8    CHLORIDE      97 - 107 mmol/L 101    Bicarbonate      24 - 31 mmol/L 25    Blood Urea Nitrogen      8 - 25 mg/dL 11    Creatinine      0.40 - 1.60 mg/dL 0.70    EGFR      >60 mL/min/1.73m*2 >90    Calcium      8.5 - 10.4 mg/dL 9.4    Albumin      3.5 - 5.0 g/dL 4.5    Alkaline Phosphatase      35 - 125 U/L 47    Total Protein      5.9 - 7.9 g/dL 7.0    AST      5 - 40 U/L 13    Bilirubin Total      0.1 - 1.2 mg/dL <0.2    ALT      5 - 40 U/L 8    Anion Gap      <=19 mmol/L 12    Color, Urine      Light-Yellow, Yellow, Dark-Yellow  Light-Yellow    Appearance, Urine      Clear  Turbid ! (N)    Specific Gravity, Urine      1.005 - 1.035  1.015    pH, Urine      5.0, 5.5, 6.0, 6.5, 7.0, 7.5, 8.0  6.0    Protein, Urine       NEGATIVE, 10 (TRACE), 20 (TRACE) mg/dL 10 (TRACE)    Glucose, Urine      Normal mg/dL Normal    Blood, Urine      NEGATIVE  NEGATIVE    Ketones, Urine      NEGATIVE mg/dL NEGATIVE    Bilirubin, Urine      NEGATIVE  NEGATIVE    Urobilinogen, Urine      Normal mg/dL Normal    Nitrite, Urine      NEGATIVE  NEGATIVE    Leukocyte Esterase, Urine      NEGATIVE  500 Wilfrido/µL !    WBC, Urine      1-5, NONE /HPF 21-50 !    WBC Clumps, Urine      Reference range not established. /HPF OCCASIONAL    RBC, Urine      NONE, 1-2, 3-5 /HPF 6-10 !    Squamous Epithelial Cells, Urine      Reference range not established. /HPF 1-9 (SPARSE)    Bacteria, Urine      NONE SEEN /HPF 3+ !    Budding Yeast, Urine      NONE /HPF PRESENT !    Mucus, Urine      Reference range not established. /LPF 1+    IgG      700 - 1,600 mg/dL 1,150    IgA      70 - 400 mg/dL 183    IgM      40 - 230 mg/dL 69    C3d Immune Complexes      ug Eq/mL 11.1    Immune Complexes, C1q Binding      ug Eq/mL <1.2    C1Q Complement      10.3 - 20.5 mg/dL 12.7    Vitamin D, 25-Hydroxy, Total      31 - 100 ng/mL 20 (L)    Anti-DNA (DS)      <5.0 IU/mL 1.0    Creatine Kinase      24 - 195 U/L 27    C3 Complement      87 - 200 mg/dL 114    C-Reactive Protein      0.00 - 2.00 mg/dL <0.30    IgE      0 - 214 IU/mL 70    Vectra Scan Result 32 (Moderate)   HCQ Scan Result neg   Sed Rate      0 - 20 mm/h 15    C4 Complement      10 - 50 mg/dL 16       Assessment/Plan  1. Other organ or system involvement in systemic lupus erythematosus (CMS/[facility])    2. 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 Vitamin D deficiency    4. Encounter for long-term (current) use of medications       Orders Placed This Encounter   Procedures    C1Q Complement    C4 Complement    C3 Complement    CBC and Auto Differential    Citrulline Antibody, IgG    Comprehensive Metabolic Panel    C-Reactive Protein    Creatine Kinase    Sedimentation Rate    Vectra; [facility]; 323425 -  Miscellaneous Test    Urinalysis with Reflex Culture and Microscopic    Plaquenil level; [facility]; 201976 - Miscellaneous Test    Vitamin D 25-Hydroxy,Total (for eval of Vitamin D levels)      Since last appt, adherent and tolerating Benlysta weekly injections as well as  mg daily,  Has forgotten some doses of HCQ.  Notes with Benlysta that she gets good days and right before she is due next injection, symptoms come back including LN get larger, joint pain/stiffness.  Has been active with limited crutch use- mainly due to nerve pain- slowly improving.  Denies any recent or current infection.  Not on any NSAIDs or glucocorticoids.  ROS+ for LN swelling, joint pain, stiffness, raynaud's without pitting/ulceration.  Rapid 3 consistent with moderate severity.  Labs reviewed including moderately elevated Vectra, low vit D and HCQ.  D/w pt tx options and decided on   Since doing well,   Continue with  mg daily and Benlysta weekly injections.  Replete vit D.  Continue with physical activity and exercise and wean off crutch.  Advised of possible side effects and importance of monitoring.   All questions answered.  Patient to follow up with primary care provider regarding all other medical issues not addressed today and for medical chart updating.    Kelly Maurer MD      Patient Care Team:  Yuli Noel MD as PCP - General (Family Medicine)  Rosetta Lopez MD as PCP - [facility] PCP  Amelia Quan MD as Primary Care Provider  Kelly Maurer MD as Consulting Physician (Rheumatology)

## 2024-03-19 DIAGNOSIS — M06.09 POLYARTHRITIS WITH NEGATIVE RHEUMATOID FACTOR (MULTI): ICD-10-CM

## 2024-03-19 DIAGNOSIS — M47.819 SPONDYLOARTHRITIS: ICD-10-CM

## 2024-03-19 RX ORDER — BELIMUMAB 200 MG/ML
SOLUTION SUBCUTANEOUS
Qty: 4 ML | Refills: 11 | Status: SHIPPED | OUTPATIENT
Start: 2024-03-19

## 2024-07-02 ENCOUNTER — OFFICE VISIT (OUTPATIENT)
Dept: PRIMARY CARE | Facility: CLINIC | Age: 19
End: 2024-07-02
Payer: COMMERCIAL

## 2024-07-02 VITALS
HEIGHT: 61 IN | BODY MASS INDEX: 20.96 KG/M2 | TEMPERATURE: 98.6 F | DIASTOLIC BLOOD PRESSURE: 60 MMHG | OXYGEN SATURATION: 94 % | SYSTOLIC BLOOD PRESSURE: 100 MMHG | WEIGHT: 111 LBS | HEART RATE: 88 BPM

## 2024-07-02 DIAGNOSIS — R35.0 URINARY FREQUENCY: Primary | ICD-10-CM

## 2024-07-02 DIAGNOSIS — M54.50 LOW BACK PAIN, UNSPECIFIED BACK PAIN LATERALITY, UNSPECIFIED CHRONICITY, UNSPECIFIED WHETHER SCIATICA PRESENT: ICD-10-CM

## 2024-07-02 LAB
Lab: ABNORMAL
POC CYSTINE CRYSTALS, URINE: ABNORMAL /HPF
POC EPITHELIAL CASTS, URINE: ABNORMAL /LPF
POC HYALINE CASTS, URINE: ABNORMAL /LPF
POC RBC, URINE: ABNORMAL
POC WBC CLUMPS, URINE: ABNORMAL
POC WBC, URINE: ABNORMAL

## 2024-07-02 PROCEDURE — 87186 SC STD MICRODIL/AGAR DIL: CPT

## 2024-07-02 PROCEDURE — 87086 URINE CULTURE/COLONY COUNT: CPT

## 2024-07-02 RX ORDER — NITROFURANTOIN 25; 75 MG/1; MG/1
100 CAPSULE ORAL 2 TIMES DAILY
Qty: 10 CAPSULE | Refills: 0 | Status: SHIPPED | OUTPATIENT
Start: 2024-07-02 | End: 2024-07-02

## 2024-07-02 RX ORDER — NITROFURANTOIN 25; 75 MG/1; MG/1
100 CAPSULE ORAL 2 TIMES DAILY
Qty: 10 CAPSULE | Refills: 0 | Status: SHIPPED | OUTPATIENT
Start: 2024-07-02 | End: 2024-07-07

## 2024-07-02 ASSESSMENT — ENCOUNTER SYMPTOMS
CHILLS: 0
DIZZINESS: 0
FREQUENCY: 1
FLANK PAIN: 0
NAUSEA: 0
BACK PAIN: 1
LIGHT-HEADEDNESS: 0
VOMITING: 0
HEMATURIA: 1
DYSURIA: 1
ABDOMINAL PAIN: 0
FEVER: 0

## 2024-07-02 ASSESSMENT — LIFESTYLE VARIABLES
AUDIT-C TOTAL SCORE: 0
HOW MANY STANDARD DRINKS CONTAINING ALCOHOL DO YOU HAVE ON A TYPICAL DAY: PATIENT DOES NOT DRINK
HOW OFTEN DO YOU HAVE SIX OR MORE DRINKS ON ONE OCCASION: NEVER
SKIP TO QUESTIONS 9-10: 1
HOW OFTEN DO YOU HAVE A DRINK CONTAINING ALCOHOL: NEVER

## 2024-07-02 ASSESSMENT — PAIN SCALES - GENERAL: PAINLEVEL: 0-NO PAIN

## 2024-07-02 ASSESSMENT — COLUMBIA-SUICIDE SEVERITY RATING SCALE - C-SSRS: 1. IN THE PAST MONTH, HAVE YOU WISHED YOU WERE DEAD OR WISHED YOU COULD GO TO SLEEP AND NOT WAKE UP?: NO

## 2024-07-02 NOTE — PROGRESS NOTES
"Subjective   Patient ID: Rayna Willams is a 18 y.o. female who presents for UTI (Symp started last night /Burning , lower back pain frequent of urination ).    UTI   This is a new problem. The current episode started yesterday. The problem has been unchanged. The quality of the pain is described as burning. There has been no fever. Associated symptoms include frequency and hematuria. Pertinent negatives include no chills, flank pain, nausea or vomiting. She has tried home medications and increased fluids for the symptoms. The treatment provided mild relief. Her past medical history is significant for recurrent UTIs.          Review of Systems   Constitutional:  Negative for chills and fever.   Gastrointestinal:  Negative for abdominal pain, nausea and vomiting.   Genitourinary:  Positive for dysuria, frequency and hematuria. Negative for flank pain.   Musculoskeletal:  Positive for back pain.   Neurological:  Negative for dizziness and light-headedness.       Objective   /60   Pulse 88   Temp 37 °C (98.6 °F)   Ht 1.549 m (5' 1\")   Wt 50.3 kg (111 lb)   SpO2 94%   BMI 20.97 kg/m²  LMP: 2 weeks ago.     Physical Exam  Vitals and nursing note reviewed.   Constitutional:       General: She is not in acute distress.  Eyes:      Extraocular Movements: Extraocular movements intact.      Conjunctiva/sclera: Conjunctivae normal.   Cardiovascular:      Rate and Rhythm: Normal rate and regular rhythm.   Pulmonary:      Effort: Pulmonary effort is normal.      Breath sounds: Normal breath sounds.   Abdominal:      General: Abdomen is flat. Bowel sounds are normal.      Tenderness: There is no abdominal tenderness. There is no right CVA tenderness or left CVA tenderness.   Musculoskeletal:      Cervical back: Neck supple.   Neurological:      General: No focal deficit present.      Mental Status: She is alert.   Psychiatric:         Mood and Affect: Mood normal.       Assessment/Plan   Problem List Items Addressed " This Visit             ICD-10-CM    Urinary frequency - Primary  Acute. POCT UA Microscopy +epithelial.   Will send urine for culture.   Begin Macrobid. Risks and benefits of medication discussed and prescribed.   Increased fluids, discussed bladder hygiene.   Follow up if symptoms do not improve within 48-72 hours, or sooner for worsening.  R35.0    Relevant Medications    nitrofurantoin, macrocrystal-monohydrate, (Macrobid) 100 mg capsule    Other Relevant Orders    Urine Culture    POCT UA Microscopy manually resulted (Completed)     Other Visit Diagnoses         Codes    Low back pain, unspecified back pain laterality, unspecified chronicity, unspecified whether sciatica present     M54.50    Relevant Orders    Urine Culture    POCT UA Microscopy manually resulted (Completed)

## 2024-07-06 LAB — BACTERIA UR CULT: ABNORMAL

## 2024-07-08 ENCOUNTER — TELEPHONE (OUTPATIENT)
Dept: RHEUMATOLOGY | Facility: CLINIC | Age: 19
End: 2024-07-08
Payer: COMMERCIAL

## 2024-07-08 DIAGNOSIS — B99.9 INFECTION: Primary | ICD-10-CM

## 2024-07-08 RX ORDER — NITROFURANTOIN 25; 75 MG/1; MG/1
100 CAPSULE ORAL 2 TIMES DAILY
Qty: 10 CAPSULE | Refills: 0 | Status: SHIPPED | OUTPATIENT
Start: 2024-07-08 | End: 2024-07-13

## 2024-07-08 NOTE — TELEPHONE ENCOUNTER
Please let pt know her Ucx is positive. Antibiotic sent.  Hydrate well and have yogurt and/or probiotic daily,

## 2024-07-19 ENCOUNTER — LAB (OUTPATIENT)
Dept: LAB | Facility: LAB | Age: 19
End: 2024-07-19
Payer: COMMERCIAL

## 2024-07-19 DIAGNOSIS — M06.09 POLYARTHRITIS WITH NEGATIVE RHEUMATOID FACTOR (MULTI): ICD-10-CM

## 2024-07-19 DIAGNOSIS — E55.9 VITAMIN D DEFICIENCY: ICD-10-CM

## 2024-07-19 DIAGNOSIS — M32.19 OTHER ORGAN OR SYSTEM INVOLVEMENT IN SYSTEMIC LUPUS ERYTHEMATOSUS (MULTI): ICD-10-CM

## 2024-07-19 LAB
25(OH)D3 SERPL-MCNC: 36 NG/ML (ref 31–100)
ALBUMIN SERPL-MCNC: 4.4 G/DL (ref 3.5–5)
ALP BLD-CCNC: 47 U/L (ref 35–125)
ALT SERPL-CCNC: 7 U/L (ref 5–40)
ANION GAP SERPL CALC-SCNC: 11 MMOL/L
APPEARANCE UR: CLEAR
AST SERPL-CCNC: 16 U/L (ref 5–40)
BACTERIA #/AREA URNS AUTO: ABNORMAL /HPF
BASOPHILS # BLD AUTO: 0.06 X10*3/UL (ref 0–0.1)
BASOPHILS NFR BLD AUTO: 1 %
BILIRUB SERPL-MCNC: 0.2 MG/DL (ref 0.1–1.2)
BILIRUB UR STRIP.AUTO-MCNC: NEGATIVE MG/DL
BUN SERPL-MCNC: 9 MG/DL (ref 8–25)
C3 SERPL-MCNC: 146 MG/DL (ref 87–200)
C4 SERPL-MCNC: 18 MG/DL (ref 10–50)
CALCIUM SERPL-MCNC: 9.3 MG/DL (ref 8.5–10.4)
CCP IGG SERPL-ACNC: <1 U/ML
CHLORIDE SERPL-SCNC: 101 MMOL/L (ref 97–107)
CK SERPL-CCNC: 39 U/L (ref 24–195)
CO2 SERPL-SCNC: 25 MMOL/L (ref 24–31)
COLOR UR: ABNORMAL
CREAT SERPL-MCNC: 0.6 MG/DL (ref 0.4–1.6)
CRP SERPL-MCNC: <0.3 MG/DL (ref 0–2)
EGFRCR SERPLBLD CKD-EPI 2021: >90 ML/MIN/1.73M*2
EOSINOPHIL # BLD AUTO: 0.22 X10*3/UL (ref 0–0.7)
EOSINOPHIL NFR BLD AUTO: 3.7 %
ERYTHROCYTE [DISTWIDTH] IN BLOOD BY AUTOMATED COUNT: 12.8 % (ref 11.5–14.5)
ERYTHROCYTE [SEDIMENTATION RATE] IN BLOOD BY WESTERGREN METHOD: 14 MM/H (ref 0–20)
GLUCOSE SERPL-MCNC: 86 MG/DL (ref 65–99)
GLUCOSE UR STRIP.AUTO-MCNC: NORMAL MG/DL
HCT VFR BLD AUTO: 40.3 % (ref 36–46)
HGB BLD-MCNC: 13.1 G/DL (ref 12–16)
IMM GRANULOCYTES # BLD AUTO: 0.01 X10*3/UL (ref 0–0.7)
IMM GRANULOCYTES NFR BLD AUTO: 0.2 % (ref 0–0.9)
KETONES UR STRIP.AUTO-MCNC: NEGATIVE MG/DL
LEUKOCYTE ESTERASE UR QL STRIP.AUTO: ABNORMAL
LYMPHOCYTES # BLD AUTO: 2.17 X10*3/UL (ref 1.2–4.8)
LYMPHOCYTES NFR BLD AUTO: 36.1 %
MCH RBC QN AUTO: 30 PG (ref 26–34)
MCHC RBC AUTO-ENTMCNC: 32.5 G/DL (ref 32–36)
MCV RBC AUTO: 92 FL (ref 80–100)
MONOCYTES # BLD AUTO: 0.44 X10*3/UL (ref 0.1–1)
MONOCYTES NFR BLD AUTO: 7.3 %
MUCOUS THREADS #/AREA URNS AUTO: ABNORMAL /LPF
NEUTROPHILS # BLD AUTO: 3.11 X10*3/UL (ref 1.2–7.7)
NEUTROPHILS NFR BLD AUTO: 51.7 %
NITRITE UR QL STRIP.AUTO: NEGATIVE
NRBC BLD-RTO: 0 /100 WBCS (ref 0–0)
PH UR STRIP.AUTO: 6 [PH]
PLATELET # BLD AUTO: 324 X10*3/UL (ref 150–450)
POTASSIUM SERPL-SCNC: 4 MMOL/L (ref 3.4–5.1)
PROT SERPL-MCNC: 7.3 G/DL (ref 5.9–7.9)
PROT UR STRIP.AUTO-MCNC: ABNORMAL MG/DL
RBC # BLD AUTO: 4.36 X10*6/UL (ref 4–5.2)
RBC # UR STRIP.AUTO: NEGATIVE /UL
RBC #/AREA URNS AUTO: ABNORMAL /HPF
SODIUM SERPL-SCNC: 137 MMOL/L (ref 133–145)
SP GR UR STRIP.AUTO: 1.02
SQUAMOUS #/AREA URNS AUTO: ABNORMAL /HPF
UROBILINOGEN UR STRIP.AUTO-MCNC: NORMAL MG/DL
WBC # BLD AUTO: 6 X10*3/UL (ref 4.4–11.3)
WBC #/AREA URNS AUTO: ABNORMAL /HPF

## 2024-07-19 PROCEDURE — 80053 COMPREHEN METABOLIC PANEL: CPT

## 2024-07-19 PROCEDURE — 87086 URINE CULTURE/COLONY COUNT: CPT

## 2024-07-19 PROCEDURE — 82550 ASSAY OF CK (CPK): CPT

## 2024-07-19 PROCEDURE — 85652 RBC SED RATE AUTOMATED: CPT

## 2024-07-19 PROCEDURE — 82306 VITAMIN D 25 HYDROXY: CPT

## 2024-07-19 PROCEDURE — 86160 COMPLEMENT ANTIGEN: CPT

## 2024-07-19 PROCEDURE — 36415 COLL VENOUS BLD VENIPUNCTURE: CPT

## 2024-07-19 PROCEDURE — 81001 URINALYSIS AUTO W/SCOPE: CPT

## 2024-07-19 PROCEDURE — 86200 CCP ANTIBODY: CPT

## 2024-07-19 PROCEDURE — 86140 C-REACTIVE PROTEIN: CPT

## 2024-07-19 PROCEDURE — 89240 UNLISTED MISC PATH TEST: CPT

## 2024-07-19 PROCEDURE — 85025 COMPLETE CBC W/AUTO DIFF WBC: CPT

## 2024-07-21 LAB — BACTERIA UR CULT: NO GROWTH

## 2024-07-26 LAB — SCAN RESULT: NORMAL

## 2024-07-30 LAB — SCAN RESULT: NORMAL

## 2024-07-31 LAB — C1Q SERPL-MCNC: 10.4 MG/DL (ref 10.3–20.5)

## 2024-08-01 ENCOUNTER — OFFICE VISIT (OUTPATIENT)
Dept: RHEUMATOLOGY | Facility: CLINIC | Age: 19
End: 2024-08-01
Payer: COMMERCIAL

## 2024-08-01 VITALS
OXYGEN SATURATION: 100 % | WEIGHT: 118.17 LBS | HEART RATE: 73 BPM | BODY MASS INDEX: 22.31 KG/M2 | SYSTOLIC BLOOD PRESSURE: 103 MMHG | DIASTOLIC BLOOD PRESSURE: 58 MMHG | HEIGHT: 61 IN

## 2024-08-01 DIAGNOSIS — E55.9 VITAMIN D DEFICIENCY: ICD-10-CM

## 2024-08-01 DIAGNOSIS — M06.09 POLYARTHRITIS WITH NEGATIVE RHEUMATOID FACTOR (MULTI): ICD-10-CM

## 2024-08-01 DIAGNOSIS — Z79.899 ENCOUNTER FOR LONG-TERM (CURRENT) USE OF MEDICATIONS: ICD-10-CM

## 2024-08-01 DIAGNOSIS — M32.19 OTHER ORGAN OR SYSTEM INVOLVEMENT IN SYSTEMIC LUPUS ERYTHEMATOSUS (MULTI): Primary | ICD-10-CM

## 2024-08-01 PROCEDURE — 99213 OFFICE O/P EST LOW 20 MIN: CPT | Performed by: INTERNAL MEDICINE

## 2024-08-01 PROCEDURE — 3008F BODY MASS INDEX DOCD: CPT | Performed by: INTERNAL MEDICINE

## 2024-08-01 ASSESSMENT — ROUTINE ASSESSMENT OF PATIENT INDEX DATA (RAPID3)
ON A SCALE OF ONE TO TEN, CONSIDERING ALL THE WAYS IN WHICH ILLNESS AND HEALTH CONDITIONS MAY AFFECT YOU AT THIS TIME, PLEASE INDICATE BELOW HOW YOU ARE DOING:: 5.5
ON A SCALE OF ONE TO TEN, HOW MUCH PAIN HAVE YOU HAD BECAUSE OF YOUR CONDITION OVER THE PAST WEEK?: 6
FEELINGS_DEPRESSION: WITHOUT ANY DIFFICULTY
WALK_KILOMETERS: WITH MUCH DIFFICULTY
PICK_CLOTHES_OFF_FLOOR: WITHOUT ANY DIFFICULTY
TOTAL RAPID3 SCORE: 14.2
GOOD_NIGHTS_SLEEP: WITH SOME DIFFICULTY
FN_SCORE: 2.7
PARTIPATE_RECREATIONAL_ACTIVITIES: WITH MUCH DIFFICULTY
ON A SCALE OF ONE TO TEN, HOW MUCH PAIN HAVE YOU HAD BECAUSE OF YOUR CONDITION OVER THE PAST WEEK?: 6
SEVERITY_SCORE: 0
WALK_FLAT_GROUND: WITH MUCH DIFFICULTY
SEVERITY_SCORE: HIGH SEVERITY (HS)
IN_OUT_BED: WITH SOME DIFFICULTY
WEIGHTED_TOTAL_SCORE: 4.73
LIFT_CUP_TO_MOUTH: WITHOUT ANY DIFFICULTY
IN_OUT_TRANSPORT: WITH SOME DIFFICULTY
FEELINGS_ANXIETY_NERVOUS: WITHOUT ANY DIFFICULTY
SUM OF QUESTIONS A TO J: 8
WASH_DRY_BODY: WITHOUT ANY DIFFICULTY
ON A SCALE OF ONE TO TEN, CONSIDERING ALL THE WAYS IN WHICH ILLNESS AND HEALTH CONDITIONS MAY AFFECT YOU AT THIS TIME, PLEASE INDICATE BELOW HOW YOU ARE DOING:: 5.5
TURN_FAUCETS_OFF: WITHOUT ANY DIFFICULTY
DRESS_YOURSELF: WITHOUT ANY DIFFICULTY

## 2024-08-01 ASSESSMENT — PATIENT HEALTH QUESTIONNAIRE - PHQ9
SUM OF ALL RESPONSES TO PHQ9 QUESTIONS 1 AND 2: 0
1. LITTLE INTEREST OR PLEASURE IN DOING THINGS: NOT AT ALL
2. FEELING DOWN, DEPRESSED OR HOPELESS: NOT AT ALL

## 2024-08-01 ASSESSMENT — PAIN SCALES - GENERAL: PAINLEVEL_OUTOF10: 6

## 2024-08-01 ASSESSMENT — ENCOUNTER SYMPTOMS
LOSS OF SENSATION IN FEET: 0
OCCASIONAL FEELINGS OF UNSTEADINESS: 1
DEPRESSION: 0

## 2024-08-01 ASSESSMENT — PAIN - FUNCTIONAL ASSESSMENT: PAIN_FUNCTIONAL_ASSESSMENT: 0-10

## 2024-09-03 DIAGNOSIS — M06.09 POLYARTHRITIS WITH NEGATIVE RHEUMATOID FACTOR (MULTI): ICD-10-CM

## 2024-09-03 DIAGNOSIS — M47.819 SPONDYLOARTHRITIS: ICD-10-CM

## 2024-09-03 RX ORDER — BELIMUMAB 200 MG/ML
400 SOLUTION SUBCUTANEOUS
Qty: 4 ML | Refills: 11 | Status: SHIPPED | OUTPATIENT
Start: 2024-09-03

## 2024-09-05 DIAGNOSIS — Z78.9 USES BIRTH CONTROL: Primary | ICD-10-CM

## 2024-09-05 NOTE — TELEPHONE ENCOUNTER
Pt scheduled for 10/10 needs OCP refilled. She is at college in [place], [place]. Please send enough to get her through Oct.

## 2024-09-09 RX ORDER — NORETHINDRONE ACETATE AND ETHINYL ESTRADIOL .03; 1.5 MG/1; MG/1
1 TABLET ORAL DAILY
Qty: 63 TABLET | Refills: 3 | Status: SHIPPED | OUTPATIENT
Start: 2024-09-09 | End: 2024-11-11

## 2024-09-30 ENCOUNTER — OFFICE VISIT (OUTPATIENT)
Dept: PRIMARY CARE | Facility: CLINIC | Age: 19
End: 2024-09-30
Payer: COMMERCIAL

## 2024-09-30 VITALS
BODY MASS INDEX: 22.11 KG/M2 | OXYGEN SATURATION: 99 % | RESPIRATION RATE: 16 BRPM | SYSTOLIC BLOOD PRESSURE: 118 MMHG | HEART RATE: 60 BPM | TEMPERATURE: 98.3 F | WEIGHT: 117 LBS | DIASTOLIC BLOOD PRESSURE: 60 MMHG

## 2024-09-30 DIAGNOSIS — N30.90 CYSTITIS: Primary | ICD-10-CM

## 2024-09-30 DIAGNOSIS — R35.0 FREQUENT URINATION: ICD-10-CM

## 2024-09-30 DIAGNOSIS — R30.0 BURNING WITH URINATION: ICD-10-CM

## 2024-09-30 PROCEDURE — 99213 OFFICE O/P EST LOW 20 MIN: CPT

## 2024-09-30 PROCEDURE — 87186 SC STD MICRODIL/AGAR DIL: CPT

## 2024-09-30 PROCEDURE — 87086 URINE CULTURE/COLONY COUNT: CPT

## 2024-09-30 PROCEDURE — 1036F TOBACCO NON-USER: CPT

## 2024-09-30 RX ORDER — SULFAMETHOXAZOLE AND TRIMETHOPRIM 800; 160 MG/1; MG/1
1 TABLET ORAL 2 TIMES DAILY
Qty: 10 TABLET | Refills: 0 | Status: SHIPPED | OUTPATIENT
Start: 2024-09-30 | End: 2024-10-05

## 2024-09-30 ASSESSMENT — ENCOUNTER SYMPTOMS
FREQUENCY: 1
CARDIOVASCULAR NEGATIVE: 1
FEVER: 0
APPETITE CHANGE: 0
RESPIRATORY NEGATIVE: 1
DYSURIA: 1
VOMITING: 0
CHILLS: 0
ACTIVITY CHANGE: 0
FATIGUE: 0
NAUSEA: 0
UNEXPECTED WEIGHT CHANGE: 0
FLANK PAIN: 0
HEMATURIA: 0
DIFFICULTY URINATING: 0
GASTROINTESTINAL NEGATIVE: 1

## 2024-09-30 ASSESSMENT — PATIENT HEALTH QUESTIONNAIRE - PHQ9
2. FEELING DOWN, DEPRESSED OR HOPELESS: NOT AT ALL
SUM OF ALL RESPONSES TO PHQ9 QUESTIONS 1 AND 2: 0
1. LITTLE INTEREST OR PLEASURE IN DOING THINGS: NOT AT ALL

## 2024-09-30 ASSESSMENT — PAIN SCALES - GENERAL: PAINLEVEL: 0-NO PAIN

## 2024-09-30 NOTE — PROGRESS NOTES
Subjective     Patient ID: Rayna Willams is a 18 y.o. female who presents for UTI (Patient is here for frequent urination and burning with urination x 1 day. Patient refused the flu shot. Patient took AZO this morning and the urine was spun. ).      UTI   This is a recurrent problem. The current episode started yesterday. The problem occurs every urination. The quality of the pain is described as burning. The pain is at a severity of 7/10. There has been no fever. She is Sexually active (4 days ago). Associated symptoms include frequency, hesitancy and urgency. Pertinent negatives include no chills, flank pain, hematuria, nausea or vomiting. Treatments tried: OTC Azo. The treatment provided mild relief.           Patient's recent visit notes, medication and allergy lists, past medical surgical social hx, immunization, vitals, problem list, recent tests were reviewed by me for pertinence to this visit.    Current Outpatient Medications:     albuterol 90 mcg/actuation inhaler, Inhale 2 puffs if needed. EVERY 4 TO 6 HOURS AS NEEDED, Disp: , Rfl:     ascorbic acid (Vitamin C) 500 mg tablet, Take by mouth., Disp: , Rfl:     beclomethasone dipropionate (Qvar RediHaler) 80 mcg/actuation inhaler, Inhale 1 Inhalation 2 times a day., Disp: , Rfl:     belimumab (Benlysta) 200 mg/mL injection, Inject 2 mL (400 mg) under the skin every 7 days. INJECT 400 MG , 2 INJECTIONS SUB Q EVERY 7 DAYS, Disp: 4 mL, Rfl: 11    buPROPion XL (Wellbutrin XL) 300 mg 24 hr tablet, Take by mouth., Disp: , Rfl:     CALCIUM ORAL, Take by mouth once daily., Disp: , Rfl:     cyanocobalamin (Vitamin B-12) 500 mcg tablet, Take by mouth., Disp: , Rfl:     ergocalciferol (Vitamin D-2) 1.25 MG (71845 UT) capsule, Take 1 capsule (50,000 Units) by mouth 1 (one) time per week., Disp: , Rfl:     escitalopram (Lexapro) 20 mg tablet, Take by mouth., Disp: , Rfl:     ferrous gluconate 236 mg (27 mg iron) tablet, Take by mouth., Disp: , Rfl:     gabapentin  (Neurontin) 100 mg capsule, Take 1 capsule (100 mg) by mouth once daily., Disp: , Rfl:     gabapentin (Neurontin) 300 mg capsule, Take by mouth 3 times a day., Disp: , Rfl:     hydroxychloroquine (Plaquenil) 200 mg tablet, TAKE 1 TABLET BY MOUTH DAILY 30 DAYS, Disp: 30 tablet, Rfl: 11    norethindrone ac-eth estradioL (Loestrin) 1.5-30 mg-mcg tablet tablet, Take 1 tablet by mouth once daily., Disp: 63 tablet, Rfl: 3    traZODone (Desyrel) 150 mg tablet, Take by mouth., Disp: , Rfl:       Review of Systems   Constitutional:  Negative for activity change, appetite change, chills, fatigue, fever and unexpected weight change.   Respiratory: Negative.     Cardiovascular: Negative.    Gastrointestinal: Negative.  Negative for nausea and vomiting.   Genitourinary:  Positive for dysuria, frequency, hesitancy and urgency. Negative for difficulty urinating, flank pain, hematuria, pelvic pain, vaginal bleeding, vaginal discharge and vaginal pain.             Objective   /60 (BP Location: Left arm, Patient Position: Sitting, BP Cuff Size: Large adult)   Pulse 60   Temp 36.8 °C (98.3 °F)   Resp 16   Wt 53.1 kg (117 lb)   LMP 09/15/2024   SpO2 99%   BMI 22.11 kg/m²       Physical Exam  Vitals and nursing note reviewed.   Constitutional:       General: She is not in acute distress.     Appearance: Normal appearance.   Cardiovascular:      Rate and Rhythm: Normal rate and regular rhythm.      Heart sounds: Normal heart sounds, S1 normal and S2 normal.   Pulmonary:      Effort: Pulmonary effort is normal.      Breath sounds: Normal breath sounds and air entry.   Abdominal:      General: Bowel sounds are normal. There is no distension.      Palpations: Abdomen is soft. There is no hepatomegaly or splenomegaly.      Tenderness: There is no abdominal tenderness. There is no right CVA tenderness, left CVA tenderness, guarding or rebound.   Skin:     General: Skin is warm and dry.      Capillary Refill: Capillary refill  takes less than 2 seconds.   Neurological:      General: No focal deficit present.      Mental Status: She is alert.   Psychiatric:         Behavior: Behavior is cooperative.             Assessment & Plan  Cystitis  Acute.  Urine dipstick: +leukocytes, +nitrates  Urine sent for C & S, will call with results and adjust treatment accordingly.  Start ATB - please complete full course unless you hear otherwise from our office.  Discussed indication for antibiotic use, administration instructions, and adverse effects with patient.  Increase your fluid intake, Tylenol or Motrin as needed for pain or fever.  May use OTC AZO/UROSTAT/Cystex for symptoms relief if desired.   Discussed hygiene for prevention.  Call our office to report and new fever/chills or back pain.  Follow up in 1 week if symptoms persist.       Orders:    sulfamethoxazole-trimethoprim (Bactrim DS) 800-160 mg tablet; Take 1 tablet by mouth 2 times a day for 5 days.    Frequent urination    Orders:    Urine Culture; Future    Burning with urination    Orders:    Urine Culture; Future          Patient understands and agrees with treatment plan.    Ronna George, APRN-CNP

## 2024-10-01 ENCOUNTER — TELEPHONE (OUTPATIENT)
Dept: PRIMARY CARE | Facility: CLINIC | Age: 19
End: 2024-10-01
Payer: COMMERCIAL

## 2024-10-01 NOTE — TELEPHONE ENCOUNTER
Patient was seen by WAN and would like prescription for Bactrim transferred to [facility] in [place]. She's in [place], [place] and the prescription wasn't ready before she left yesterday. [facility] will transfer the Prescription to a [facility] in [place] and [facility] will not.    Patient can be reached at [phone]

## 2024-10-01 NOTE — TELEPHONE ENCOUNTER
Spoke with patient. She was able to get [facility] to transfer the prescription and she has picked it up.

## 2024-10-03 ENCOUNTER — TELEPHONE (OUTPATIENT)
Dept: RHEUMATOLOGY | Facility: CLINIC | Age: 19
End: 2024-10-03
Payer: COMMERCIAL

## 2024-10-03 LAB — BACTERIA UR CULT: ABNORMAL

## 2024-10-03 NOTE — TELEPHONE ENCOUNTER
Ucx +   I see a script for Macrobid.  Please verify with patient that she is taking it.   Yogurt and/or probiotic daily.

## 2024-10-10 ENCOUNTER — OFFICE VISIT (OUTPATIENT)
Dept: OBSTETRICS AND GYNECOLOGY | Facility: CLINIC | Age: 19
End: 2024-10-10
Payer: COMMERCIAL

## 2024-10-10 VITALS
HEIGHT: 61 IN | SYSTOLIC BLOOD PRESSURE: 97 MMHG | WEIGHT: 120.6 LBS | BODY MASS INDEX: 22.77 KG/M2 | DIASTOLIC BLOOD PRESSURE: 62 MMHG

## 2024-10-10 DIAGNOSIS — Z30.41 SURVEILLANCE FOR BIRTH CONTROL, ORAL CONTRACEPTIVES: ICD-10-CM

## 2024-10-10 DIAGNOSIS — Z11.3 SCREEN FOR STD (SEXUALLY TRANSMITTED DISEASE): ICD-10-CM

## 2024-10-10 DIAGNOSIS — N39.0 RECURRENT UTI: ICD-10-CM

## 2024-10-10 DIAGNOSIS — R39.11 URINARY HESITANCY: ICD-10-CM

## 2024-10-10 DIAGNOSIS — Z01.419 WELL WOMAN EXAM: Primary | ICD-10-CM

## 2024-10-10 DIAGNOSIS — N60.12 FIBROCYSTIC BREAST CHANGES OF BOTH BREASTS: ICD-10-CM

## 2024-10-10 DIAGNOSIS — R35.0 URINARY FREQUENCY: ICD-10-CM

## 2024-10-10 DIAGNOSIS — Z78.9 USES BIRTH CONTROL: ICD-10-CM

## 2024-10-10 DIAGNOSIS — N60.11 FIBROCYSTIC BREAST CHANGES OF BOTH BREASTS: ICD-10-CM

## 2024-10-10 PROCEDURE — 3008F BODY MASS INDEX DOCD: CPT | Performed by: OBSTETRICS & GYNECOLOGY

## 2024-10-10 PROCEDURE — 99395 PREV VISIT EST AGE 18-39: CPT | Performed by: OBSTETRICS & GYNECOLOGY

## 2024-10-10 PROCEDURE — 87661 TRICHOMONAS VAGINALIS AMPLIF: CPT | Mod: WESLAB | Performed by: OBSTETRICS & GYNECOLOGY

## 2024-10-10 RX ORDER — NORETHINDRONE ACETATE AND ETHINYL ESTRADIOL .03; 1.5 MG/1; MG/1
1 TABLET ORAL DAILY
Qty: 63 TABLET | Refills: 4 | Status: SHIPPED | OUTPATIENT
Start: 2024-10-10 | End: 2024-12-12

## 2024-10-10 ASSESSMENT — LIFESTYLE VARIABLES
SKIP TO QUESTIONS 9-10: 1
HOW MANY STANDARD DRINKS CONTAINING ALCOHOL DO YOU HAVE ON A TYPICAL DAY: PATIENT DOES NOT DRINK
HOW OFTEN DO YOU HAVE A DRINK CONTAINING ALCOHOL: NEVER
AUDIT-C TOTAL SCORE: 0
HOW OFTEN DO YOU HAVE SIX OR MORE DRINKS ON ONE OCCASION: NEVER

## 2024-10-10 ASSESSMENT — SOCIAL DETERMINANTS OF HEALTH (SDOH)

## 2024-10-10 ASSESSMENT — ENCOUNTER SYMPTOMS
DEPRESSION: 0
LOSS OF SENSATION IN FEET: 0
OCCASIONAL FEELINGS OF UNSTEADINESS: 0

## 2024-10-10 ASSESSMENT — PAIN SCALES - GENERAL: PAINLEVEL: 0-NO PAIN

## 2024-10-10 ASSESSMENT — PATIENT HEALTH QUESTIONNAIRE - PHQ9
2. FEELING DOWN, DEPRESSED OR HOPELESS: NOT AT ALL
SUM OF ALL RESPONSES TO PHQ9 QUESTIONS 1 & 2: 0
1. LITTLE INTEREST OR PLEASURE IN DOING THINGS: NOT AT ALL

## 2024-10-10 NOTE — PROGRESS NOTES
takes Tylenol yeah that  ESTABLISHED ANNUAL GYN VISIT     Patient Name:  Rayna Willams  :  2005  MR #:  [id number]  Acct #:  [id number]      ASSESSMENT/PLAN:   1. Well woman exam (Primary)      2. Surveillance for birth control, oral contraceptives      3. Screen for STD (sexually transmitted disease)    - C. trachomatis / N. gonorrhoeae, Amplified  - Trichomonas vaginalis, Amplified    4. Recurrent UTI    - Referral to Urogynecology; Future    5. Urinary frequency    - Referral to Urogynecology; Future    6. Urinary hesitancy    - Referral to Urogynecology; Future    7. Fibrocystic breast changes of both breasts    -Nelli CortesDebra sent literature over the portal for education      8. Uses birth control    - norethindrone ac-eth estradioL (Loestrin) 1.5-30 mg-mcg tablet tablet; Take 1 tablet by mouth once daily.  Dispense: 63 tablet; Refill: 4      Counseling:  Medication education:  Education:  All new and/or current medications discussed and reviewed including side effects with patient/caregiver, Understanding:  Caregiver/Patient expressed understanding., Adherence:  Barriers to adherence identified and discussed if present, Education:  The patient is counseled regarding potential side-effects of all new medications, Understanding:  Patient expressed understanding, Adherence:  No barriers to adherence identified.     Exercise:  Minutes per week  Patient states:  30-60 min 3+ days/week, time for warm-up/cool-down by stretching..     Preventive Measures:  Clinical breast examination date:  Yearly. Pap smear to start age 19, Q3 years if normal.    Reviewed Gardasil history or recommendation.    OB/GYN Preventive:  Discussed vit d, calcium supplements, weight-bearing exercise.. Discussed genitourinary skin hygiene.  Discussed barriers and STI prevention.      Follow-up: 1 year for annual exam no      Chief Complaint   Patient presents with    Contraception     Follow up    Well Women Visit     Pt states she is  here for a well woman exam. Pt denies breast pain, pelvic pain,  painful intercourse vaginal itching, irritation,  odor and discharge. Pt denies any urinary concerns.        Rayna Willams is a 18 y.o. F  Patient's last menstrual period was 09/15/2024. who presents for annual exam.  Patient is concerned about frequent UTIs, almost monthly, documented by her PCP.  Getting to the point where they are changing up antibiotics to help reduce resistance.  She has hesitancy, sometimes sitting for an hour due to incomplete voiding.  She is sexually active.  Up for STD screening today.  Pap smear due next year.  Usually denies flank pain or back pain    Personal history of autoimmune disorders.  Further discussion reveals family history of vaginal prolapse including bladder.    GYNH:      Gardasil vaccine? Yes -   Sexual activity?  Yes - .  Coitarche age:  yes  Birth control history: OCP    Past Medical History:   Diagnosis Date    Achilles tendinitis, right leg 10/19/2018    Achilles tendinitis of right lower extremity    Acute laryngotracheitis 2016    Acute viral laryngotracheitis    Acute pharyngitis, unspecified 2016    Sore throat    Acute pharyngitis, unspecified 2016    Sore throat    Acute pharyngitis, unspecified 2013    Sore throat    Acute upper respiratory infection, unspecified 2017    Viral URI    Anxiety disorder, unspecified 2016    Anxiety    Chondrocostal junction syndrome (tietze) 2019    Costochondritis    Contusion of right index finger without damage to nail, initial encounter 2016    Contusion of right index finger    Displaced fracture of proximal phalanx of left little finger, initial encounter for closed fracture 2015    Fracture of fifth finger, proximal phalanx, left, closed    Encounter for immunization 10/18/2016    Encounter for immunization    Enlarged lymph nodes, unspecified 2020    Enlarged lymph nodes    Generalized skin  eruption due to drugs and medicaments taken internally 08/14/2013    Dermatitis due to drugs or medicines    Other chest pain 07/25/2018    Musculoskeletal chest pain    Other conditions influencing health status 08/14/2013    Pneumonia    Other conditions influencing health status 01/20/2017    History of frequent headaches    Pain in left foot     Left foot pain    Pain in right ankle and joints of right foot 08/27/2018    Ankle pain, right    Pain in unspecified foot 04/25/2014    Foot pain    Personal history of other (healed) physical injury and trauma 04/25/2014    History of sprain of ankle    Personal history of other (healed) physical injury and trauma 04/25/2014    History of sprain of foot    Personal history of other diseases of the circulatory system 12/19/2019    History of lymphadenitis    Personal history of other diseases of the musculoskeletal system and connective tissue 07/30/2020    History of neck pain    Personal history of other diseases of the respiratory system 01/22/2017    History of sore throat    Personal history of other infectious and parasitic diseases 01/20/2017    History of viral infection    Personal history of other infectious and parasitic diseases 08/08/2013    History of viral illness    Personal history of other specified conditions 10/26/2020    History of dysuria    Personal history of other specified conditions 10/23/2020    History of urinary urgency    Personal history of other specified conditions 09/28/2017    History of exertional chest pain    Personal history of other specified conditions 10/18/2016    History of headache    Personal history of other specified conditions 08/14/2013    History of fever    Personal history of other specified conditions 12/19/2019    History of fatigue    Postconcussional syndrome 10/18/2016    Post concussion syndrome    Posterior tibial tendinitis, right leg 03/25/2018    Posterior tibial tendinitis of right lower extremity     Strain of muscle, fascia and tendon of triceps, left arm, initial encounter 2017    Triceps strain, left, initial encounter    Strain of unspecified muscle and tendon at ankle and foot level, right foot, initial encounter 2018    Right ankle strain    Unspecified asthma, uncomplicated ([facility]) 11/15/2019    Mild asthma    Unspecified disorder of eyelid 2016    Lesion of upper eyelid    Unspecified injury of left elbow, initial encounter 2017    Elbow injury, left, initial encounter    Unspecified injury of right wrist, hand and finger(s), initial encounter 2016    Injury of hand, right       Past Surgical History:   Procedure Laterality Date    OTHER SURGICAL HISTORY  10/08/2020    Lymphadenectomy    SPINAL CORD STIMULATOR IMPLANT  2022    WISDOM TOOTH EXTRACTION         OB History          0    Para   0    Term   0       0    AB   0    Living   0         SAB   0    IAB   0    Ectopic   0    Multiple   0    Live Births   0                  Social History     Tobacco Use    Smoking status: Never     Passive exposure: Never    Smokeless tobacco: Never   Vaping Use    Vaping status: Never Used   Substance Use Topics    Alcohol use: Never    Drug use: Never        Family History   Problem Relation Name Age of Onset    No Known Problems Mother          other conditions influencing health    No Known Problems Father          other conditions influencing health    No Known Problems Sister      No Known Problems Sister      No Known Problems Brother      Breast cancer Maternal Grandmother      Cancer Maternal Grandfather      Cancer Paternal Grandmother      Skin cancer Paternal Grandfather         Prior to Admission medications    Medication Sig Start Date End Date Taking? Authorizing Provider   albuterol 90 mcg/actuation inhaler Inhale 2 puffs if needed. EVERY 4 TO 6 HOURS AS NEEDED 20  Yes Historical Provider, MD   ascorbic acid (Vitamin C) 500 mg tablet Take by mouth.    Yes Historical Provider, MD   beclomethasone dipropionate (Qvar RediHaler) 80 mcg/actuation inhaler Inhale 1 Inhalation 2 times a day. 12/16/19  Yes Historical Provider, MD   belimumab (Benlysta) 200 mg/mL injection Inject 2 mL (400 mg) under the skin every 7 days. INJECT 400 MG , 2 INJECTIONS SUB Q EVERY 7 DAYS 9/3/24  Yes Kelly Maurer MD   buPROPion XL (Wellbutrin XL) 300 mg 24 hr tablet Take by mouth.   Yes Historical Provider, MD   CALCIUM ORAL Take by mouth once daily.   Yes Historical Provider, MD   cyanocobalamin (Vitamin B-12) 500 mcg tablet Take by mouth.   Yes Historical Provider, MD   ergocalciferol (Vitamin D-2) 1.25 MG (02135 UT) capsule Take 1 capsule (50,000 Units) by mouth 1 (one) time per week. 3/3/23  Yes Historical Provider, MD   escitalopram (Lexapro) 20 mg tablet Take by mouth. 8/28/19  Yes Historical Provider, MD   ferrous gluconate 236 mg (27 mg iron) tablet Take by mouth.   Yes Historical Provider, MD   gabapentin (Neurontin) 100 mg capsule Take 1 capsule (100 mg) by mouth once daily. 1/16/24  Yes Historical Provider, MD   gabapentin (Neurontin) 300 mg capsule Take by mouth 3 times a day. 6/7/22  Yes Historical Provider, MD   hydroxychloroquine (Plaquenil) 200 mg tablet TAKE 1 TABLET BY MOUTH DAILY 30 DAYS 10/7/23  Yes Kelly Maurer MD   norethindrone ac-eth estradioL (Loestrin) 1.5-30 mg-mcg tablet tablet Take 1 tablet by mouth once daily. 9/9/24 11/11/24 Yes Skylar Roque DO   traZODone (Desyrel) 150 mg tablet Take by mouth.   Yes Historical Provider, MD   sulfamethoxazole-trimethoprim (Bactrim DS) 800-160 mg tablet Take 1 tablet by mouth 2 times a day for 5 days. 9/30/24 10/5/24  GABI Argueta-CNP       Allergies   Allergen Reactions    Pollen Extracts Itching     Sneezing, itching, watery eyes         ROS:   [facility] - WOMEN ONLY:          Breast Lump No acute changes.  Hot Flashes Occasional.  Painful [place] no.  Vaginal Discharge no.      [facility] - ROS  "Update:          Unexplained Weight Change no.  Pain anywhere in your body no.  Black or bloody stools no.  Problems with urination no.  Rashes or sores no.  Sexual problems no.  Depression or anxiety problems no.  Do you feel threatened by anyone No.      OBJECTIVE:   BP 97/62   Ht 1.549 m (5' 1\")   Wt 54.7 kg (120 lb 9.6 oz)   LMP 09/15/2024   BMI 22.79 kg/m²   Body mass index is 22.79 kg/m².     Physical Exam  GENERAL:   General Appearance:  well-developed, well-nourished, no functional handicap, well-groomed.  Hygiene:  good.  Ill-appearance:  none.  Mental Status:  alert and oriented.  Mood/Affect:  pleasant, appropriate.  Speech:  clear.  Eye contact:  normal.  Appears stated age:  yes.    LUNGS:  Effort:  no respiratory distress.    BREASTS: General:  no masses, no tenderness, no skin changes, no nipple abnormality, and no axillary lymphadenopathy.   ABDOMEN: Tenderness:  none.  Distention:  none.   GENITOURINARY - FEMALE: Bladder:  NT  External genitalia:  Normal.    GC/CT done.   DERMATOLOGY:  Skin:  Minimal acne. Normal turgor and thickness.  EXTREMITIES: Normal:  no anomalies.  Edema:  none.    NEUROLOGICAL: Orientation:  alert and oriented x 3.   PSYCHOLOGY: Affect:  appropriate.  Mood:  pleasant.    Labs reviewed:Yes -     Imaging reviewed:Yes -     Note: This dictation was generated using Dragon voice recognition software. Please excuse any grammatical or spelling errors that may have occurred using the system.         "

## 2024-10-11 LAB — T VAGINALIS RRNA SPEC QL NAA+PROBE: NEGATIVE

## 2024-12-17 ENCOUNTER — APPOINTMENT (OUTPATIENT)
Dept: OBSTETRICS AND GYNECOLOGY | Facility: CLINIC | Age: 19
End: 2024-12-17
Payer: COMMERCIAL

## 2025-01-27 ENCOUNTER — TELEPHONE (OUTPATIENT)
Dept: OBSTETRICS AND GYNECOLOGY | Facility: CLINIC | Age: 20
End: 2025-01-27
Payer: COMMERCIAL

## 2025-01-27 NOTE — TELEPHONE ENCOUNTER
Est pt last seen 10/2024 left a voice message requesting to schedule an appt with Dr. Roque. Pt c/o rash that is appearing.    Nurse attempted to contact pt/lmtco back

## 2025-01-31 ENCOUNTER — APPOINTMENT (OUTPATIENT)
Dept: OBSTETRICS AND GYNECOLOGY | Facility: CLINIC | Age: 20
End: 2025-01-31
Payer: COMMERCIAL

## 2025-01-31 VITALS
DIASTOLIC BLOOD PRESSURE: 69 MMHG | WEIGHT: 117.4 LBS | SYSTOLIC BLOOD PRESSURE: 106 MMHG | HEIGHT: 61 IN | BODY MASS INDEX: 22.16 KG/M2 | HEART RATE: 96 BPM

## 2025-01-31 DIAGNOSIS — B37.9 YEAST INFECTION: ICD-10-CM

## 2025-01-31 DIAGNOSIS — N39.0 RECURRENT UTI: Primary | ICD-10-CM

## 2025-01-31 DIAGNOSIS — R39.11 URINARY HESITANCY: ICD-10-CM

## 2025-01-31 DIAGNOSIS — R35.0 URINARY FREQUENCY: ICD-10-CM

## 2025-01-31 LAB
POC APPEARANCE, URINE: CLEAR
POC BILIRUBIN, URINE: NEGATIVE
POC BLOOD, URINE: NEGATIVE
POC COLOR, URINE: YELLOW
POC GLUCOSE, URINE: NEGATIVE MG/DL
POC KETONES, URINE: NEGATIVE MG/DL
POC LEUKOCYTES, URINE: ABNORMAL
POC NITRITE,URINE: NEGATIVE
POC PH, URINE: 7 PH
POC PROTEIN, URINE: ABNORMAL MG/DL
POC SPECIFIC GRAVITY, URINE: 1.01
POC UROBILINOGEN, URINE: 0.2 EU/DL

## 2025-01-31 PROCEDURE — 99204 OFFICE O/P NEW MOD 45 MIN: CPT | Performed by: OBSTETRICS & GYNECOLOGY

## 2025-01-31 PROCEDURE — 99214 OFFICE O/P EST MOD 30 MIN: CPT | Performed by: OBSTETRICS & GYNECOLOGY

## 2025-01-31 PROCEDURE — 3008F BODY MASS INDEX DOCD: CPT | Performed by: OBSTETRICS & GYNECOLOGY

## 2025-01-31 PROCEDURE — 81003 URINALYSIS AUTO W/O SCOPE: CPT | Performed by: OBSTETRICS & GYNECOLOGY

## 2025-01-31 RX ORDER — NITROFURANTOIN 25; 75 MG/1; MG/1
100 CAPSULE ORAL DAILY PRN
Qty: 30 CAPSULE | Refills: 1 | Status: SHIPPED | OUTPATIENT
Start: 2025-01-31 | End: 2025-04-01

## 2025-01-31 ASSESSMENT — ENCOUNTER SYMPTOMS
DYSURIA: 1
NEUROLOGICAL NEGATIVE: 1
FREQUENCY: 1
CARDIOVASCULAR NEGATIVE: 1
CONSTITUTIONAL NEGATIVE: 1
MUSCULOSKELETAL NEGATIVE: 1
RESPIRATORY NEGATIVE: 1
PSYCHIATRIC NEGATIVE: 1
ENDOCRINE NEGATIVE: 1
GASTROINTESTINAL NEGATIVE: 1
EYES NEGATIVE: 1

## 2025-01-31 NOTE — PROGRESS NOTES
[facility] Department of Urogynecology   Shira Bell MD, MPH   [phone]    ASSESSMENT AND PLAN:   19-year-old female being assessed for recurrent UTIs and yeast infection.    Diagnoses:  #1 Recurrent UTIs  #2 Yeast infection    Plan:  Recurrent UTIs  - Plan to initiate post-coital antibiotic ppx with Macrobid to be taken on days of sexual intercourse. She is amenable.  - Rx for Macrobid 100 mg sent to patient's preferred pharmacy.  - Placed standing order for urine cultures at any  lab when symptomatic.  - Advised her to monitor for signs of pyelonephritis (fever, nausea, vomiting) and mentioned she should seek immediate medical attention if these occur.  - If UTIs persist (or increase in frequency) - in other words, more than one in 6 months, we will reassess and consider alternative strategies.  - Educated her on the limited efficacy of cranberry supplements and focus on antibiotic ppx.  - POCT UA: 30 protein, large leukocytes.    2. Yeast infection  - She has a recent hx of yeast infection, which was treated with topical antifungal.  - Advised using a 7-day antifungal treatment for future occurrences, as opposed to the one day dose of monistat    Follow-up with Dr. Bell in 6 months or PRN sooner if sxs persist or worsen.    Scribe Attestation  By signing my name below, IJudd Scribe, attest that this documentation has been prepared under the direction and in the presence of Shira Bell MD MPH on 01/31/2025 at 4:10 PM.    Problem List Items Addressed This Visit          Genitourinary and Reproductive    Urinary frequency     Other Visit Diagnoses       Recurrent UTI        Relevant Medications    nitrofurantoin, macrocrystal-monohydrate, (Macrobid) 100 mg capsule    Other Relevant Orders    POCT UA Automated manually resulted (Completed)    Measure post void residual (Completed)    Urinalysis with Reflex Culture and Microscopic    Urinary hesitancy               I spent a total  of 45 minutes in face to face and non face to face time.     I Dr. Bell, personally performed the services described in the documentation as scribed in my presence and confirm it is both complete and accurate.  Shira Bell MD, MPH, FACOG       Shira Bell MD, MPH, FACOG     New    HISTORY OF PRESENT ILLNESS:     Referral by: Dr. Skylar Roque     Record Review:   - 10/10/2024 Dr. Roque note RE: Well woman exam, surveillance for OCPs, screening for STD, recurrent UTI (referral to Urogynecology), urinary frequency, urinary hesitancy, fibrocystic breast changes of both breasts, uses birth control    Urine Culture History:  - 2024: >100,000 E coli  - 2024: no growth  - 2024: >100,000 Enterococcus faecalis     Urinary Symptoms:   - She has 5-6 UTIs each year.  - She has burning with UTIs when she urinates.  - She also experiences frequency with UTIs.  - She takes cranberry supplements daily (sometimes twice a day), but she has never been on an antibiotic for ppx.  - She thinks these UTIs may be caused by intercourse. She says 90% of the time she feels a UTI coming after she has intercourse.  - She is interested in trying a medication for post-coital UTI ppx.  - No hx of kidney infections, but occasional fever during UTIs.  - No urinary incontinence or hematuria reported.  - She uses Azo for sx relief.  - She often feels the need to urinate, but she is unable to.  - She sometimes experiences bladder discomfort that leads to missing school.    Bowel Symptoms:   - She has bowel movements approximately 3-4x/week.  - She sometimes has to strain with bowel movements.    OBGYN History and Sexual Activity:   - She is sexually active - on OCPs.  - She recently completed tx for a yeast infection with a seven-day topical antifungal regimen.  -   - She denies dyspareunia       Past Medical History:     Past Medical History:   Diagnosis Date    Achilles tendinitis, right leg 10/19/2018    Achilles  tendinitis of right lower extremity    Acute laryngotracheitis 11/02/2016    Acute viral laryngotracheitis    Acute pharyngitis, unspecified 05/13/2016    Sore throat    Acute pharyngitis, unspecified 11/02/2016    Sore throat    Acute pharyngitis, unspecified 08/08/2013    Sore throat    Acute upper respiratory infection, unspecified 09/04/2017    Viral URI    Anxiety disorder, unspecified 05/13/2016    Anxiety    Chondrocostal junction syndrome (tietze) 09/12/2019    Costochondritis    Contusion of right index finger without damage to nail, initial encounter 01/26/2016    Contusion of right index finger    Displaced fracture of proximal phalanx of left little finger, initial encounter for closed fracture 12/17/2015    Fracture of fifth finger, proximal phalanx, left, closed    Encounter for immunization 10/18/2016    Encounter for immunization    Enlarged lymph nodes, unspecified 07/30/2020    Enlarged lymph nodes    Generalized skin eruption due to drugs and medicaments taken internally 08/14/2013    Dermatitis due to drugs or medicines    Other chest pain 07/25/2018    Musculoskeletal chest pain    Other conditions influencing health status 08/14/2013    Pneumonia    Other conditions influencing health status 01/20/2017    History of frequent headaches    Pain in left foot     Left foot pain    Pain in right ankle and joints of right foot 08/27/2018    Ankle pain, right    Pain in unspecified foot 04/25/2014    Foot pain    Personal history of other (healed) physical injury and trauma 04/25/2014    History of sprain of ankle    Personal history of other (healed) physical injury and trauma 04/25/2014    History of sprain of foot    Personal history of other diseases of the circulatory system 12/19/2019    History of lymphadenitis    Personal history of other diseases of the musculoskeletal system and connective tissue 07/30/2020    History of neck pain    Personal history of other diseases of the respiratory  system 01/22/2017    History of sore throat    Personal history of other infectious and parasitic diseases 01/20/2017    History of viral infection    Personal history of other infectious and parasitic diseases 08/08/2013    History of viral illness    Personal history of other specified conditions 10/26/2020    History of dysuria    Personal history of other specified conditions 10/23/2020    History of urinary urgency    Personal history of other specified conditions 09/28/2017    History of exertional chest pain    Personal history of other specified conditions 10/18/2016    History of headache    Personal history of other specified conditions 08/14/2013    History of fever    Personal history of other specified conditions 12/19/2019    History of fatigue    Postconcussional syndrome 10/18/2016    Post concussion syndrome    Posterior tibial tendinitis, right leg 03/25/2018    Posterior tibial tendinitis of right lower extremity    Strain of muscle, fascia and tendon of triceps, left arm, initial encounter 02/27/2017    Triceps strain, left, initial encounter    Strain of unspecified muscle and tendon at ankle and foot level, right foot, initial encounter 01/18/2018    Right ankle strain    Unspecified asthma, uncomplicated ([facility]) 11/15/2019    Mild asthma    Unspecified disorder of eyelid 01/26/2016    Lesion of upper eyelid    Unspecified injury of left elbow, initial encounter 02/27/2017    Elbow injury, left, initial encounter    Unspecified injury of right wrist, hand and finger(s), initial encounter 01/26/2016    Injury of hand, right          Past Surgical History:     Past Surgical History:   Procedure Laterality Date    OTHER SURGICAL HISTORY  10/08/2020    Lymphadenectomy    SPINAL CORD STIMULATOR IMPLANT  12/2022    WISDOM TOOTH EXTRACTION           Medications:     Prior to Admission medications    Medication Sig Start Date End Date Taking? Authorizing Provider   albuterol 90 mcg/actuation inhaler  Inhale 2 puffs if needed. EVERY 4 TO 6 HOURS AS NEEDED 7/31/20  Yes Historical Provider, MD   ascorbic acid (Vitamin C) 500 mg tablet Take by mouth.   Yes Historical Provider, MD   beclomethasone dipropionate (Qvar RediHaler) 80 mcg/actuation inhaler Inhale 1 Inhalation 2 times a day. 12/16/19  Yes Historical Provider, MD   belimumab (Benlysta) 200 mg/mL injection Inject 2 mL (400 mg) under the skin every 7 days. INJECT 400 MG , 2 INJECTIONS SUB Q EVERY 7 DAYS 9/3/24  Yes Kelly Maurer MD   buPROPion XL (Wellbutrin XL) 300 mg 24 hr tablet Take by mouth.   Yes Historical Provider, MD   CALCIUM ORAL Take by mouth once daily.   Yes Historical Provider, MD   cyanocobalamin (Vitamin B-12) 500 mcg tablet Take by mouth.   Yes Historical Provider, MD   ergocalciferol (Vitamin D-2) 1.25 MG (91375 UT) capsule Take 1 capsule (50,000 Units) by mouth 1 (one) time per week. 3/3/23  Yes Historical Provider, MD   escitalopram (Lexapro) 20 mg tablet Take by mouth. 8/28/19  Yes Historical Provider, MD   ferrous gluconate 236 mg (27 mg iron) tablet Take by mouth.   Yes Historical Provider, MD   gabapentin (Neurontin) 100 mg capsule Take 1 capsule (100 mg) by mouth once daily. 1/16/24  Yes Historical Provider, MD   gabapentin (Neurontin) 300 mg capsule Take by mouth 3 times a day. 6/7/22  Yes Historical Provider, MD   hydroxychloroquine (Plaquenil) 200 mg tablet TAKE 1 TABLET BY MOUTH DAILY 30 DAYS 10/7/23  Yes Kelly Maurer MD   traZODone (Desyrel) 150 mg tablet Take by mouth.   Yes Historical Provider, MD   nitrofurantoin, macrocrystal-monohydrate, (Macrobid) 100 mg capsule Take 1 capsule (100 mg) by mouth once daily as needed (intercourse). 1/31/25 4/1/25  Shira Bell MD MPH   norethindrone ac-eth estradioL (Loestrin) 1.5-30 mg-mcg tablet tablet Take 1 tablet by mouth once daily. 10/10/24 12/12/24  DO SHARITA Geuvara  Review of Systems   Constitutional: Negative.    HENT: Negative.     Eyes:  "Negative.    Respiratory: Negative.     Cardiovascular: Negative.    Gastrointestinal: Negative.    Endocrine: Negative.    Genitourinary:  Positive for dysuria and frequency.   Musculoskeletal: Negative.    Neurological: Negative.    Psychiatric/Behavioral: Negative.            PHYSICAL EXAM:    /69 (Patient Position: Sitting)   Pulse 96   Ht 1.549 m (5' 1\")   Wt 53.3 kg (117 lb 6.4 oz)   LMP 01/10/2025   BMI 22.18 kg/m²   Patient's last menstrual period was 01/10/2025.    Declines chaperone for physical exam.      Well developed, well nourished, in no apparent distress.   Neurologic/Psychiatric:  Awake, Alert and Oriented times 3.  Affect normal.     GENITAL/URINARY:     External Genitalia:  The patient has normal appearing external genitalia, normal skenes and bartholins glands, and a normal hair distribution.  Her vulva is without lesions, erythema or discharge.  It is non-tender with appropriate sensation.     Urethral Meatus:  Size normal, Location normal, Lesions absent, Prolapse absent.    Urethra:  Fullness absent, Masses absent.    Bladder:  Fullness absent, Masses absent, Tenderness absent.    Vagina:  General appearance normal, Estrogen effect normal, Discharge absent, Lesions absent.    Cervix: Normal, no discharge.   Uterus:  normal size  Adnexa:   normal, no pain, no masses    Anus/Perineum:  Lesions absent and Masses absent normal sphincter tone, no lesions  Normal Perineum    Physical Exam  Genitourinary:      Genitourinary Comments: Slight pinkness, consistent with yeast infection tx. No pain or masses noted over BL adnexa.   Vitals reviewed.           The patient has 4 out of 5 pelvic floor muscle strength.    She does not have myofascial tenderness on exam.         Data and DIAGNOSTIC STUDIES REVIEWED   No results found.   Lab Results   Component Value Date    URINECULTURE >100,000 Escherichia coli (A) 09/30/2024      Lab Results   Component Value Date    GLUCOSE 86 07/19/2024    " CALCIUM 9.3 07/19/2024     07/19/2024    K 4.0 07/19/2024    CO2 25 07/19/2024     07/19/2024    BUN 9 07/19/2024    CREATININE 0.60 07/19/2024     Lab Results   Component Value Date    WBC 6.0 07/19/2024    HGB 13.1 07/19/2024    HCT 40.3 07/19/2024    MCV 92 07/19/2024     07/19/2024

## 2025-02-03 NOTE — PATIENT INSTRUCTIONS
Take macrobid on days you have intercourse.   Leave a urine sample for a urine culture if your feel you have a UTI    Please call the office with any questions or concerns.   [phone]

## 2025-02-07 DIAGNOSIS — N39.0 RECURRENT UTI: ICD-10-CM

## 2025-02-14 DIAGNOSIS — N39.0 RECURRENT UTI: ICD-10-CM

## 2025-02-19 ENCOUNTER — TRANSCRIBE ORDERS (OUTPATIENT)
Dept: RHEUMATOLOGY | Facility: CLINIC | Age: 20
End: 2025-02-19
Payer: COMMERCIAL

## 2025-02-19 DIAGNOSIS — M32.19 OTHER ORGAN OR SYSTEM INVOLVEMENT IN SYSTEMIC LUPUS ERYTHEMATOSUS: ICD-10-CM

## 2025-02-19 DIAGNOSIS — M06.09 POLYARTHRITIS WITH NEGATIVE RHEUMATOID FACTOR (MULTI): ICD-10-CM

## 2025-02-19 DIAGNOSIS — E55.9 VITAMIN D DEFICIENCY: ICD-10-CM

## 2025-02-21 DIAGNOSIS — N39.0 RECURRENT UTI: ICD-10-CM

## 2025-02-28 DIAGNOSIS — N39.0 RECURRENT UTI: ICD-10-CM

## 2025-03-05 ENCOUNTER — APPOINTMENT (OUTPATIENT)
Dept: RHEUMATOLOGY | Facility: CLINIC | Age: 20
End: 2025-03-05
Payer: COMMERCIAL

## 2025-03-07 DIAGNOSIS — N39.0 RECURRENT UTI: ICD-10-CM

## 2025-03-14 DIAGNOSIS — N39.0 RECURRENT UTI: ICD-10-CM

## 2025-03-21 DIAGNOSIS — N39.0 RECURRENT UTI: ICD-10-CM

## 2025-03-28 DIAGNOSIS — N39.0 RECURRENT UTI: ICD-10-CM

## 2025-04-03 LAB
25(OH)D3+25(OH)D2 SERPL-MCNC: 17 NG/ML (ref 30–100)
ALBUMIN SERPL-MCNC: 4.5 G/DL (ref 3.6–5.1)
ALP SERPL-CCNC: 42 U/L (ref 31–125)
ALT SERPL-CCNC: 6 U/L (ref 6–29)
ANION GAP SERPL CALCULATED.4IONS-SCNC: 11 MMOL/L (CALC) (ref 7–17)
AST SERPL-CCNC: 12 U/L (ref 10–30)
BASOPHILS # BLD AUTO: 29 CELLS/UL (ref 0–200)
BASOPHILS NFR BLD AUTO: 0.5 %
BILIRUB SERPL-MCNC: 0.4 MG/DL (ref 0.2–1.2)
BUN SERPL-MCNC: 8 MG/DL (ref 7–25)
C1Q SERPL-MCNC: NORMAL UG/ML
C3 SERPL-MCNC: 134 MG/DL (ref 83–193)
C4 SERPL-MCNC: 13 MG/DL (ref 15–57)
CALCIUM SERPL-MCNC: 9.3 MG/DL (ref 8.6–10.2)
CCP IGG SERPL-ACNC: <16 UNITS
CHLORIDE SERPL-SCNC: 104 MMOL/L (ref 98–110)
CK SERPL-CCNC: 28 U/L (ref 20–239)
CO2 SERPL-SCNC: 23 MMOL/L (ref 20–32)
CREAT SERPL-MCNC: 0.61 MG/DL (ref 0.5–0.96)
CRP SERPL-MCNC: 3 MG/L
EGFRCR SERPLBLD CKD-EPI 2021: 130 ML/MIN/1.73M2
EOSINOPHIL # BLD AUTO: 128 CELLS/UL (ref 15–500)
EOSINOPHIL NFR BLD AUTO: 2.2 %
ERYTHROCYTE [DISTWIDTH] IN BLOOD BY AUTOMATED COUNT: 12 % (ref 11–15)
ERYTHROCYTE [SEDIMENTATION RATE] IN BLOOD BY WESTERGREN METHOD: 11 MM/H
GLUCOSE SERPL-MCNC: 75 MG/DL (ref 65–139)
HCT VFR BLD AUTO: 40.2 % (ref 35–45)
HGB BLD-MCNC: 12.9 G/DL (ref 11.7–15.5)
LYMPHOCYTES # BLD AUTO: 1670 CELLS/UL (ref 850–3900)
LYMPHOCYTES NFR BLD AUTO: 28.8 %
MCH RBC QN AUTO: 28.9 PG (ref 27–33)
MCHC RBC AUTO-ENTMCNC: 32.1 G/DL (ref 32–36)
MCV RBC AUTO: 89.9 FL (ref 80–100)
MONOCYTES # BLD AUTO: 302 CELLS/UL (ref 200–950)
MONOCYTES NFR BLD AUTO: 5.2 %
NEUTROPHILS # BLD AUTO: 3671 CELLS/UL (ref 1500–7800)
NEUTROPHILS NFR BLD AUTO: 63.3 %
PLATELET # BLD AUTO: 312 THOUSAND/UL (ref 140–400)
PMV BLD REES-ECKER: 10.8 FL (ref 7.5–12.5)
POTASSIUM SERPL-SCNC: 3.9 MMOL/L (ref 3.5–5.3)
PROT SERPL-MCNC: 7.4 G/DL (ref 6.1–8.1)
RBC # BLD AUTO: 4.47 MILLION/UL (ref 3.8–5.1)
SODIUM SERPL-SCNC: 138 MMOL/L (ref 135–146)
WBC # BLD AUTO: 5.8 THOUSAND/UL (ref 3.8–10.8)

## 2025-04-04 DIAGNOSIS — N39.0 RECURRENT UTI: ICD-10-CM

## 2025-04-08 LAB
25(OH)D3+25(OH)D2 SERPL-MCNC: 17 NG/ML (ref 30–100)
ALBUMIN SERPL-MCNC: 4.5 G/DL (ref 3.6–5.1)
ALP SERPL-CCNC: 42 U/L (ref 31–125)
ALT SERPL-CCNC: 6 U/L (ref 6–29)
ANION GAP SERPL CALCULATED.4IONS-SCNC: 11 MMOL/L (CALC) (ref 7–17)
AST SERPL-CCNC: 12 U/L (ref 10–30)
BASOPHILS # BLD AUTO: 29 CELLS/UL (ref 0–200)
BASOPHILS NFR BLD AUTO: 0.5 %
BILIRUB SERPL-MCNC: 0.4 MG/DL (ref 0.2–1.2)
BUN SERPL-MCNC: 8 MG/DL (ref 7–25)
C1Q SERPL-MCNC: 5.2 MG/DL (ref 5–8.6)
C3 SERPL-MCNC: 134 MG/DL (ref 83–193)
C4 SERPL-MCNC: 13 MG/DL (ref 15–57)
CALCIUM SERPL-MCNC: 9.3 MG/DL (ref 8.6–10.2)
CCP IGG SERPL-ACNC: <16 UNITS
CHLORIDE SERPL-SCNC: 104 MMOL/L (ref 98–110)
CK SERPL-CCNC: 28 U/L (ref 20–239)
CO2 SERPL-SCNC: 23 MMOL/L (ref 20–32)
CREAT SERPL-MCNC: 0.61 MG/DL (ref 0.5–0.96)
CRP SERPL-MCNC: 3 MG/L
EGFRCR SERPLBLD CKD-EPI 2021: 130 ML/MIN/1.73M2
EOSINOPHIL # BLD AUTO: 128 CELLS/UL (ref 15–500)
EOSINOPHIL NFR BLD AUTO: 2.2 %
ERYTHROCYTE [DISTWIDTH] IN BLOOD BY AUTOMATED COUNT: 12 % (ref 11–15)
ERYTHROCYTE [SEDIMENTATION RATE] IN BLOOD BY WESTERGREN METHOD: 11 MM/H
GLUCOSE SERPL-MCNC: 75 MG/DL (ref 65–139)
HCT VFR BLD AUTO: 40.2 % (ref 35–45)
HGB BLD-MCNC: 12.9 G/DL (ref 11.7–15.5)
LYMPHOCYTES # BLD AUTO: 1670 CELLS/UL (ref 850–3900)
LYMPHOCYTES NFR BLD AUTO: 28.8 %
MCH RBC QN AUTO: 28.9 PG (ref 27–33)
MCHC RBC AUTO-ENTMCNC: 32.1 G/DL (ref 32–36)
MCV RBC AUTO: 89.9 FL (ref 80–100)
MONOCYTES # BLD AUTO: 302 CELLS/UL (ref 200–950)
MONOCYTES NFR BLD AUTO: 5.2 %
NEUTROPHILS # BLD AUTO: 3671 CELLS/UL (ref 1500–7800)
NEUTROPHILS NFR BLD AUTO: 63.3 %
PLATELET # BLD AUTO: 312 THOUSAND/UL (ref 140–400)
PMV BLD REES-ECKER: 10.8 FL (ref 7.5–12.5)
POTASSIUM SERPL-SCNC: 3.9 MMOL/L (ref 3.5–5.3)
PROT SERPL-MCNC: 7.4 G/DL (ref 6.1–8.1)
RBC # BLD AUTO: 4.47 MILLION/UL (ref 3.8–5.1)
SODIUM SERPL-SCNC: 138 MMOL/L (ref 135–146)
WBC # BLD AUTO: 5.8 THOUSAND/UL (ref 3.8–10.8)

## 2025-04-11 DIAGNOSIS — N39.0 RECURRENT UTI: ICD-10-CM

## 2025-04-16 ENCOUNTER — OFFICE VISIT (OUTPATIENT)
Dept: RHEUMATOLOGY | Facility: CLINIC | Age: 20
End: 2025-04-16
Payer: COMMERCIAL

## 2025-04-16 VITALS
HEART RATE: 80 BPM | WEIGHT: 115 LBS | OXYGEN SATURATION: 100 % | BODY MASS INDEX: 21.71 KG/M2 | RESPIRATION RATE: 17 BRPM | TEMPERATURE: 98 F | DIASTOLIC BLOOD PRESSURE: 71 MMHG | HEIGHT: 61 IN | SYSTOLIC BLOOD PRESSURE: 106 MMHG

## 2025-04-16 DIAGNOSIS — R76.8 OTHER SPECIFIED ABNORMAL IMMUNOLOGICAL FINDINGS IN SERUM: ICD-10-CM

## 2025-04-16 DIAGNOSIS — E55.9 VITAMIN D DEFICIENCY: Primary | ICD-10-CM

## 2025-04-16 PROCEDURE — 3008F BODY MASS INDEX DOCD: CPT | Performed by: INTERNAL MEDICINE

## 2025-04-16 PROCEDURE — 99214 OFFICE O/P EST MOD 30 MIN: CPT | Performed by: INTERNAL MEDICINE

## 2025-04-16 RX ORDER — ERGOCALCIFEROL 1.25 MG/1
1.25 CAPSULE ORAL
Qty: 12 CAPSULE | Refills: 1 | Status: SHIPPED | OUTPATIENT
Start: 2025-04-20

## 2025-04-16 RX ORDER — MELOXICAM 15 MG/1
15 TABLET ORAL DAILY
Qty: 90 TABLET | Refills: 3 | Status: SHIPPED | OUTPATIENT
Start: 2025-04-16 | End: 2026-04-16

## 2025-04-16 RX ORDER — HYDROXYCHLOROQUINE SULFATE 200 MG/1
200 TABLET, FILM COATED ORAL DAILY
Qty: 90 TABLET | Refills: 3 | Status: SHIPPED | OUTPATIENT
Start: 2025-04-16

## 2025-04-16 ASSESSMENT — ROUTINE ASSESSMENT OF PATIENT INDEX DATA (RAPID3)
FN_SCORE: 2.3
WASH_DRY_BODY: WITH SOME DIFFICULTY
ON A SCALE OF ONE TO TEN, CONSIDERING ALL THE WAYS IN WHICH ILLNESS AND HEALTH CONDITIONS MAY AFFECT YOU AT THIS TIME, PLEASE INDICATE BELOW HOW YOU ARE DOING:: 5.5
PARTIPATE_RECREATIONAL_ACTIVITIES: UNABLE TO DO
ON A SCALE OF ONE TO TEN, HOW MUCH PAIN HAVE YOU HAD BECAUSE OF YOUR CONDITION OVER THE PAST WEEK?: 5.5
IN_OUT_BED: WITHOUT ANY DIFFICULTY
WALK_FLAT_GROUND: WITH SOME DIFFICULTY
GOOD_NIGHTS_SLEEP: WITH SOME DIFFICULTY
SEVERITY_SCORE: 0
ON A SCALE OF ONE TO TEN, HOW MUCH PAIN HAVE YOU HAD BECAUSE OF YOUR CONDITION OVER THE PAST WEEK?: 5.5
WEIGHTED_TOTAL_SCORE: 4.43
PICK_CLOTHES_OFF_FLOOR: WITH SOME DIFFICULTY
FEELINGS_DEPRESSION: WITH SOME DIFFICULTY
SUM OF QUESTIONS A TO J: 7
IN_OUT_TRANSPORT: WITHOUT ANY DIFFICULTY
SEVERITY_SCORE: HIGH SEVERITY (HS)
TURN_FAUCETS_OFF: WITHOUT ANY DIFFICULTY
TOTAL RAPID3 SCORE: 13.3
LIFT_CUP_TO_MOUTH: WITHOUT ANY DIFFICULTY
ON A SCALE OF ONE TO TEN, CONSIDERING ALL THE WAYS IN WHICH ILLNESS AND HEALTH CONDITIONS MAY AFFECT YOU AT THIS TIME, PLEASE INDICATE BELOW HOW YOU ARE DOING:: 5.5
DRESS_YOURSELF: WITHOUT ANY DIFFICULTY
FEELINGS_ANXIETY_NERVOUS: WITH SOME DIFFICULTY
WALK_KILOMETERS: WITH SOME DIFFICULTY

## 2025-04-16 ASSESSMENT — COLUMBIA-SUICIDE SEVERITY RATING SCALE - C-SSRS
1. IN THE PAST MONTH, HAVE YOU WISHED YOU WERE DEAD OR WISHED YOU COULD GO TO SLEEP AND NOT WAKE UP?: NO
6. HAVE YOU EVER DONE ANYTHING, STARTED TO DO ANYTHING, OR PREPARED TO DO ANYTHING TO END YOUR LIFE?: NO
2. HAVE YOU ACTUALLY HAD ANY THOUGHTS OF KILLING YOURSELF?: NO

## 2025-04-16 ASSESSMENT — PATIENT HEALTH QUESTIONNAIRE - PHQ9
SUM OF ALL RESPONSES TO PHQ9 QUESTIONS 1 AND 2: 2
2. FEELING DOWN, DEPRESSED OR HOPELESS: SEVERAL DAYS
10. IF YOU CHECKED OFF ANY PROBLEMS, HOW DIFFICULT HAVE THESE PROBLEMS MADE IT FOR YOU TO DO YOUR WORK, TAKE CARE OF THINGS AT HOME, OR GET ALONG WITH OTHER PEOPLE: SOMEWHAT DIFFICULT
1. LITTLE INTEREST OR PLEASURE IN DOING THINGS: SEVERAL DAYS

## 2025-04-16 ASSESSMENT — PAIN SCALES - GENERAL: PAINLEVEL_OUTOF10: 6

## 2025-04-16 NOTE — PROGRESS NOTES
[facility]/  Rheumatology  [address] [place] [address], [address]  [place] [address]  Phone: [phone]  Fax: [phone]    Rheumatology Progress Note 04/16/2025      Rayna Willams is a 19 y.o. female here for   Chief Complaint   Patient presents with    Follow-up       Last Visit: 2/23/24    Rheum Hx      Previous Tx    Health Maintenance  DXA- none  Malignancy Hx- none  Immunization History   Administered Date(s) Administered    DTP 01/13/2004, 03/05/2004, 05/26/2004, 02/16/2005, 03/03/2009    DTaP vaccine, pediatric (DAPTACEL) 03/03/2009    DTaP, Unspecified 01/13/2004, 03/25/2004, 05/27/2004, 02/17/2005    Flu vaccine (IIV4), preservative free *Check age/dose* 10/18/2016    HPV 9-valent vaccine (GARDASIL 9) 12/18/2015, 02/19/2016, 06/20/2016    HPV, Unspecified 12/18/2015, 02/19/2016, 06/20/2016    Hep A, Unspecified 03/03/2010, 07/16/2010    Hepatitis A vaccine, pediatric/adolescent (HAVRIX, VAQTA) 03/03/2009, 07/16/2010    Hepatitis B vaccine, 19 yrs and under (RECOMBIVAX, ENGERIX) 2003, 2003, 08/17/2004    Hepatitis B vaccine, adult *Check Product/Dose* 2003    HiB, unspecified 01/13/2004, 03/25/2004, 05/27/2004, 02/17/2005    Influenza, Unspecified 11/15/2019, 11/03/2020    Influenza, live, intranasal 09/08/2011, 09/11/2012, 10/31/2013, 09/30/2014, 10/19/2018    Influenza, live, intranasal, quadrivalent 10/16/2009, 12/18/2015    Influenza, seasonal, injectable 11/15/2019, 11/03/2020    MMR vaccine, subcutaneous (MMR II) 02/17/2005, 02/28/2008    Meningococcal ACWY vaccine (MENVEO) 11/15/2019    Meningococcal ACWY-D (Menactra) 4-valent conjugate vaccine 12/02/2014    Meningococcal B vaccine, recombinant (TRUMENBA) 11/15/2019, 12/19/2019    Meningococcal B, Unspecified 11/15/2019, 12/19/2019    Meningococcal MCV4, Unspecified 12/02/2014    Novel Influenza-H1N1-09, nasal 11/05/2009, 12/03/2009    Novel influenza-H1N1-09, preservative-free 10/05/2009    Pneumococcal  Conjugate PCV 7 01/13/2004, 03/25/2004, 08/17/2004    Pneumococcal polysaccharide vaccine, 23-valent, age 2 years and older (PNEUMOVAX 23) 01/13/2004, 03/25/2004, 08/17/2004    Polio, Unspecified 01/13/2004, 03/25/2004, 05/19/2005, 02/28/2008    Poliovirus vaccine, subcutaneous (IPOL) 01/12/2004, 01/13/2004, 03/25/2004, 05/19/2005, 02/28/2008    Td (adult), unspecified 11/03/2020    Tdap vaccine, age 7 year and older (BOOSTRIX, ADACEL) 12/02/2014    Varicella vaccine, subcutaneous (VARIVAX) 11/18/2004, 03/03/2009          Past Medical History:   Diagnosis Date    Achilles tendinitis, right leg 10/19/2018    Achilles tendinitis of right lower extremity    Acute laryngotracheitis 11/02/2016    Acute viral laryngotracheitis    Acute pharyngitis, unspecified 05/13/2016    Sore throat    Acute pharyngitis, unspecified 11/02/2016    Sore throat    Acute pharyngitis, unspecified 08/08/2013    Sore throat    Acute upper respiratory infection, unspecified 09/04/2017    Viral URI    Anxiety disorder, unspecified 05/13/2016    Anxiety    Chondrocostal junction syndrome (tietze) 09/12/2019    Costochondritis    Contusion of right index finger without damage to nail, initial encounter 01/26/2016    Contusion of right index finger    Displaced fracture of proximal phalanx of left little finger, initial encounter for closed fracture 12/17/2015    Fracture of fifth finger, proximal phalanx, left, closed    Encounter for immunization 10/18/2016    Encounter for immunization    Enlarged lymph nodes, unspecified 07/30/2020    Enlarged lymph nodes    Generalized skin eruption due to drugs and medicaments taken internally 08/14/2013    Dermatitis due to drugs or medicines    Other chest pain 07/25/2018    Musculoskeletal chest pain    Other conditions influencing health status 08/14/2013    Pneumonia    Other conditions influencing health status 01/20/2017    History of frequent headaches    Pain in left foot     Left foot pain    Pain  in right ankle and joints of right foot 08/27/2018    Ankle pain, right    Pain in unspecified foot 04/25/2014    Foot pain    Personal history of other (healed) physical injury and trauma 04/25/2014    History of sprain of ankle    Personal history of other (healed) physical injury and trauma 04/25/2014    History of sprain of foot    Personal history of other diseases of the circulatory system 12/19/2019    History of lymphadenitis    Personal history of other diseases of the musculoskeletal system and connective tissue 07/30/2020    History of neck pain    Personal history of other diseases of the respiratory system 01/22/2017    History of sore throat    Personal history of other infectious and parasitic diseases 01/20/2017    History of viral infection    Personal history of other infectious and parasitic diseases 08/08/2013    History of viral illness    Personal history of other specified conditions 10/26/2020    History of dysuria    Personal history of other specified conditions 10/23/2020    History of urinary urgency    Personal history of other specified conditions 09/28/2017    History of exertional chest pain    Personal history of other specified conditions 10/18/2016    History of headache    Personal history of other specified conditions 08/14/2013    History of fever    Personal history of other specified conditions 12/19/2019    History of fatigue    Postconcussional syndrome 10/18/2016    Post concussion syndrome    Posterior tibial tendinitis, right leg 03/25/2018    Posterior tibial tendinitis of right lower extremity    Strain of muscle, fascia and tendon of triceps, left arm, initial encounter 02/27/2017    Triceps strain, left, initial encounter    Strain of unspecified muscle and tendon at ankle and foot level, right foot, initial encounter 01/18/2018    Right ankle strain    Unspecified asthma, uncomplicated ([facility]) 11/15/2019    Mild asthma    Unspecified disorder of eyelid 01/26/2016     Lesion of upper eyelid    Unspecified injury of left elbow, initial encounter 02/27/2017    Elbow injury, left, initial encounter    Unspecified injury of right wrist, hand and finger(s), initial encounter 01/26/2016    Injury of hand, right      Past Surgical History:   Procedure Laterality Date    OTHER SURGICAL HISTORY  10/08/2020    Lymphadenectomy    SPINAL CORD STIMULATOR IMPLANT  12/2022    WISDOM TOOTH EXTRACTION        Current Outpatient Medications   Medication Sig Dispense Refill    albuterol 90 mcg/actuation inhaler Inhale 2 puffs if needed. EVERY 4 TO 6 HOURS AS NEEDED      ascorbic acid (Vitamin C) 500 mg tablet Take by mouth.      beclomethasone dipropionate (Qvar RediHaler) 80 mcg/actuation inhaler Inhale 1 Inhalation 2 times a day.      belimumab (Benlysta) 200 mg/mL injection Inject 2 mL (400 mg) under the skin every 7 days. INJECT 400 MG , 2 INJECTIONS SUB Q EVERY 7 DAYS 4 mL 11    buPROPion XL (Wellbutrin XL) 300 mg 24 hr tablet Take by mouth.      CALCIUM ORAL Take by mouth once daily.      cyanocobalamin (Vitamin B-12) 500 mcg tablet Take by mouth.      ergocalciferol (Vitamin D-2) 1250 mcg (50,000 units) capsule Take 1 capsule (1.25 mg) by mouth 1 (one) time per week. 12 capsule 1    escitalopram (Lexapro) 20 mg tablet Take by mouth.      ferrous gluconate 236 mg (27 mg iron) tablet Take by mouth.      gabapentin (Neurontin) 100 mg capsule Take 1 capsule (100 mg) by mouth once daily.      gabapentin (Neurontin) 300 mg capsule Take by mouth 3 times a day.      hydroxychloroquine (Plaquenil) 200 mg tablet Take 1 tablet (200 mg) by mouth once daily. 90 tablet 3    meloxicam (Mobic) 15 mg tablet Take 1 tablet (15 mg) by mouth once daily. 90 tablet 3    norethindrone ac-eth estradioL (Loestrin) 1.5-30 mg-mcg tablet tablet Take 1 tablet by mouth once daily. 63 tablet 4    traZODone (Desyrel) 150 mg tablet Take by mouth.       No current facility-administered medications for this visit.     "  Allergies   Allergen Reactions    Pollen Extracts Itching     Sneezing, itching, watery eyes        Vitals:    04/16/25 1201   BP: 106/71   BP Location: Left arm   Patient Position: Sitting   BP Cuff Size: Adult   Pulse: 80   Resp: 17   Temp: 36.7 °C (98 °F)   TempSrc: Temporal   SpO2: 100%   Weight: 52.2 kg (115 lb)   Height: 1.549 m (5' 1\")           Rapid 3  Function Score (FN): 2.3  Pain Score (PN) (0-10): 5.5  Patient Global (PTGL) (0-10): 5.5  Rapid3 Score: 13.3  RAPID3 Weighted Score: 4.43       Workup     Component      Latest Ref Rng 4/1/2025   WHITE BLOOD CELL COUNT      3.8 - 10.8 Thousand/uL 5.8    RED BLOOD CELL COUNT      3.80 - 5.10 Million/uL 4.47    HEMOGLOBIN      11.7 - 15.5 g/dL 12.9    HEMATOCRIT      35.0 - 45.0 % 40.2    MCV      80.0 - 100.0 fL 89.9    MCH      27.0 - 33.0 pg 28.9    MCHC      32.0 - 36.0 g/dL 32.1    RDW      11.0 - 15.0 % 12.0    PLATELET COUNT      140 - 400 Thousand/uL 312    MPV      7.5 - 12.5 fL 10.8    ABSOLUTE NEUTROPHILS      1,500 - 7,800 cells/uL 3,671    ABSOLUTE LYMPHOCYTES      850 - 3,900 cells/uL 1,670    ABSOLUTE MONOCYTES      200 - 950 cells/uL 302    ABSOLUTE EOSINOPHILS      15 - 500 cells/uL 128    ABSOLUTE BASOPHILS      0 - 200 cells/uL 29    NEUTROPHILS      % 63.3    LYMPHOCYTES      % 28.8    MONOCYTES      % 5.2    EOSINOPHILS      % 2.2    BASOPHILS      % 0.5    GLUCOSE      65 - 139 mg/dL 75    UREA NITROGEN (BUN)      7 - 25 mg/dL 8    CREATININE      0.50 - 0.96 mg/dL 0.61    EGFR      > OR = 60 mL/min/1.73m2 130    SODIUM      135 - 146 mmol/L 138    POTASSIUM      3.5 - 5.3 mmol/L 3.9    CHLORIDE      98 - 110 mmol/L 104    CARBON DIOXIDE      20 - 32 mmol/L 23    ELECTROLYTE BALANCE      7 - 17 mmol/L (calc) 11    CALCIUM      8.6 - 10.2 mg/dL 9.3    PROTEIN, TOTAL      6.1 - 8.1 g/dL 7.4    ALBUMIN      3.6 - 5.1 g/dL 4.5    BILIRUBIN, TOTAL      0.2 - 1.2 mg/dL 0.4    ALKALINE PHOSPHATASE      31 - 125 U/L 42    AST      10 - 30 U/L 12 "    ALT      6 - 29 U/L 6    COMPLEMENT COMPONENT C1Q      5.0 - 8.6 mg/dL 5.2    COMPLEMENT COMPONENT C4C      15 - 57 mg/dL 13 (L)    COMPLEMENT COMPONENT C3C      83 - 193 mg/dL 134    C-REACTIVE PROTEIN      <8.0 mg/L 3.0    CREATINE KINASE, TOTAL      20 - 239 U/L 28    SED RATE BY MODIFIED WESTERGREN      < OR = 20 mm/h 11    VITAMIN D,25-OH,TOTAL,IA      30 - 100 ng/mL 17 (L)    CYCLIC CITRULLINATED PEPTIDE (CCP) AB (IGG)      UNITS <16         Assessment/Plan  1. Vitamin D deficiency    2. Other specified abnormal immunological findings in serum         No orders of the defined types were placed in this encounter.     Since last appt, adherent and tolerating Benlysta weekly injections as well as  mg daily,  Has forgotten some doses of HCQ.  Notes with Benlysta that she gets good days and right before she is due next injection, symptoms come back including LN get larger, joint pain/stiffness.  Has been active with limited crutch use- mainly due to nerve pain- slowly improving.  Denies any recent or current infection.  Not on any NSAIDs or glucocorticoids.  ROS+ for LN swelling, joint pain, stiffness, raynaud's without pitting/ulceration.  Rapid 3 consistent with moderate severity.  Labs reviewed including moderately elevated Vectra, low vit D and HCQ.  D/w pt tx options and decided on   Since doing well,   Continue with  mg daily and Benlysta weekly injections.  Replete vit D.  Continue with physical activity and exercise and wean off crutch.  Advised of possible side effects and importance of monitoring.   All questions answered.  Patient to follow up with primary care provider regarding all other medical issues not addressed today and for medical chart updating.        Since last appt, adherent and tolerating  Denies any recent or current infection.  Not on any NSAIDs or glucocorticoids.  ROS+ for hair loss, joint pain/stiffness, raynaud's without pitting/ulceration, weakness L knee  Rapid 3  consistent with high severity  Labs reviewed  D/w pt tx options   Advised of possible side effects and importance of monitoring.   All questions answered.  Patient to follow up with primary care provider regarding all other medical issues not addressed today and for medical chart updating.     Kelly Maurer MD      Patient Care Team:  Yuli Noel MD as PCP - General (Family Medicine)  GABI Argueta-CNP as PCP - [facility] PCP  Amelia Quan MD as Primary Care Provider  Kelly Maurer MD as Consulting Physician (Rheumatology)  Dionte Calabrese MD as Referring Physician (Allergy and Immunology)

## 2025-04-18 DIAGNOSIS — N39.0 RECURRENT UTI: ICD-10-CM

## 2025-04-25 DIAGNOSIS — N39.0 RECURRENT UTI: ICD-10-CM

## 2025-05-02 DIAGNOSIS — N39.0 RECURRENT UTI: ICD-10-CM

## 2025-05-05 DIAGNOSIS — M06.09 POLYARTHRITIS WITH NEGATIVE RHEUMATOID FACTOR (MULTI): ICD-10-CM

## 2025-05-05 DIAGNOSIS — M32.9 SYSTEMIC LUPUS ERYTHEMATOSUS, UNSPECIFIED SLE TYPE, UNSPECIFIED ORGAN INVOLVEMENT STATUS (MULTI): Primary | ICD-10-CM

## 2025-05-05 DIAGNOSIS — M47.819 SPONDYLOARTHRITIS: ICD-10-CM

## 2025-05-09 DIAGNOSIS — N39.0 RECURRENT UTI: ICD-10-CM

## 2025-05-15 RX ORDER — BELIMUMAB 200 MG/ML
200 SOLUTION SUBCUTANEOUS
Qty: 4 ML | Refills: 11 | Status: SHIPPED | OUTPATIENT
Start: 2025-05-15

## 2025-05-15 NOTE — PROGRESS NOTES
Per Dr. Galdamez:       Please help with resuming Benlysta injections. Patient has been on it a couple of years and was doing well until it was not dispensed anymore. She used to get copay assistance.  She has been off it for 4 months. Not sure what happened. Thank you     Confirmed diagnosis of SLE. 200mg subcutaneous weekly.

## 2025-05-16 DIAGNOSIS — N39.0 RECURRENT UTI: ICD-10-CM

## 2025-05-23 DIAGNOSIS — N39.0 RECURRENT UTI: ICD-10-CM

## 2025-05-30 DIAGNOSIS — M32.9 SYSTEMIC LUPUS ERYTHEMATOSUS, UNSPECIFIED SLE TYPE, UNSPECIFIED ORGAN INVOLVEMENT STATUS (MULTI): ICD-10-CM

## 2025-05-30 DIAGNOSIS — N39.0 RECURRENT UTI: ICD-10-CM

## 2025-05-30 RX ORDER — BELIMUMAB 200 MG/ML
200 SOLUTION SUBCUTANEOUS
Qty: 4 ML | Refills: 11 | Status: SHIPPED | OUTPATIENT
Start: 2025-05-30

## 2025-05-30 NOTE — PROGRESS NOTES
Ligia     This medication has been approved through 05/29/2026 and the approval letter has been scanned into the patients chart for your reference. However, the patient has to fill with [facility]; please reroute.

## 2025-06-06 DIAGNOSIS — N39.0 RECURRENT UTI: ICD-10-CM

## 2025-06-13 DIAGNOSIS — N39.0 RECURRENT UTI: ICD-10-CM

## 2025-06-20 DIAGNOSIS — N39.0 RECURRENT UTI: ICD-10-CM

## 2025-06-27 DIAGNOSIS — N39.0 RECURRENT UTI: ICD-10-CM

## 2025-07-04 ENCOUNTER — HOSPITAL ENCOUNTER (EMERGENCY)
Facility: HOSPITAL | Age: 20
Discharge: HOME | End: 2025-07-04
Payer: MEDICARE

## 2025-07-04 VITALS
OXYGEN SATURATION: 97 % | DIASTOLIC BLOOD PRESSURE: 72 MMHG | HEIGHT: 61 IN | SYSTOLIC BLOOD PRESSURE: 128 MMHG | BODY MASS INDEX: 20.77 KG/M2 | HEART RATE: 97 BPM | TEMPERATURE: 98.8 F | RESPIRATION RATE: 17 BRPM | WEIGHT: 110 LBS

## 2025-07-04 DIAGNOSIS — S13.4XXA WHIPLASH INJURY TO NECK, INITIAL ENCOUNTER: Primary | ICD-10-CM

## 2025-07-04 DIAGNOSIS — N39.0 RECURRENT UTI: ICD-10-CM

## 2025-07-04 DIAGNOSIS — R11.0 NAUSEA: ICD-10-CM

## 2025-07-04 PROCEDURE — 99283 EMERGENCY DEPT VISIT LOW MDM: CPT

## 2025-07-04 RX ORDER — IBUPROFEN 400 MG/1
400 TABLET, FILM COATED ORAL ONCE
Status: DISCONTINUED | OUTPATIENT
Start: 2025-07-04 | End: 2025-07-04 | Stop reason: HOSPADM

## 2025-07-04 RX ORDER — CYCLOBENZAPRINE HCL 10 MG
5 TABLET ORAL 2 TIMES DAILY PRN
Qty: 10 TABLET | Refills: 0 | Status: SHIPPED | OUTPATIENT
Start: 2025-07-04 | End: 2025-07-14

## 2025-07-04 RX ORDER — ONDANSETRON 4 MG/1
4 TABLET, FILM COATED ORAL EVERY 6 HOURS
Qty: 12 TABLET | Refills: 0 | Status: SHIPPED | OUTPATIENT
Start: 2025-07-04 | End: 2025-07-07

## 2025-07-04 RX ORDER — IBUPROFEN 400 MG/1
400 TABLET, FILM COATED ORAL EVERY 6 HOURS PRN
Qty: 28 TABLET | Refills: 0 | Status: SHIPPED | OUTPATIENT
Start: 2025-07-04 | End: 2025-07-11

## 2025-07-04 RX ORDER — ONDANSETRON 4 MG/1
4 TABLET, ORALLY DISINTEGRATING ORAL ONCE
Status: DISCONTINUED | OUTPATIENT
Start: 2025-07-04 | End: 2025-07-04 | Stop reason: HOSPADM

## 2025-07-04 ASSESSMENT — PAIN - FUNCTIONAL ASSESSMENT: PAIN_FUNCTIONAL_ASSESSMENT: 0-10

## 2025-07-04 ASSESSMENT — PAIN SCALES - GENERAL: PAINLEVEL_OUTOF10: 1

## 2025-07-04 ASSESSMENT — PAIN DESCRIPTION - DESCRIPTORS: DESCRIPTORS: TIGHTNESS

## 2025-07-04 ASSESSMENT — PAIN DESCRIPTION - LOCATION: LOCATION: NECK
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
## 2025-07-11 DIAGNOSIS — N39.0 RECURRENT UTI: ICD-10-CM

## 2025-07-18 DIAGNOSIS — N39.0 RECURRENT UTI: ICD-10-CM

## 2025-07-25 ENCOUNTER — TRANSCRIBE ORDERS (OUTPATIENT)
Facility: CLINIC | Age: 20
End: 2025-07-25
Payer: COMMERCIAL

## 2025-07-25 DIAGNOSIS — N39.0 RECURRENT UTI: ICD-10-CM

## 2025-07-25 DIAGNOSIS — E55.9 VITAMIN D DEFICIENCY: ICD-10-CM

## 2025-07-25 DIAGNOSIS — M06.09 POLYARTHRITIS WITH NEGATIVE RHEUMATOID FACTOR (MULTI): ICD-10-CM

## 2025-08-01 DIAGNOSIS — N39.0 RECURRENT UTI: ICD-10-CM

## 2025-08-08 DIAGNOSIS — N39.0 RECURRENT UTI: ICD-10-CM

## 2025-08-15 ENCOUNTER — APPOINTMENT (OUTPATIENT)
Facility: CLINIC | Age: 20
End: 2025-08-15
Payer: COMMERCIAL

## 2025-08-15 DIAGNOSIS — N39.0 RECURRENT UTI: ICD-10-CM

## 2025-08-20 ENCOUNTER — APPOINTMENT (OUTPATIENT)
Facility: CLINIC | Age: 20
End: 2025-08-20
Payer: COMMERCIAL

## 2025-08-22 DIAGNOSIS — N39.0 RECURRENT UTI: ICD-10-CM

## 2025-09-17 ENCOUNTER — APPOINTMENT (OUTPATIENT)
Facility: CLINIC | Age: 20
End: 2025-09-17
Payer: COMMERCIAL